# Patient Record
Sex: FEMALE | Race: WHITE | Employment: FULL TIME | URBAN - METROPOLITAN AREA
[De-identification: names, ages, dates, MRNs, and addresses within clinical notes are randomized per-mention and may not be internally consistent; named-entity substitution may affect disease eponyms.]

---

## 2020-01-30 ENCOUNTER — HOSPITAL ENCOUNTER (OUTPATIENT)
Dept: SLEEP CENTER | Age: 60
Discharge: HOME | End: 2020-02-01
Payer: COMMERCIAL

## 2020-01-30 PROCEDURE — 95810 POLYSOM 6/> YRS 4/> PARAM: CPT

## 2023-09-19 LAB
ALANINE AMINOTRANSFERASE (SGPT) (U/L) IN SER/PLAS: 16 U/L (ref 7–45)
ALBUMIN (G/DL) IN SER/PLAS: 4.1 G/DL (ref 3.4–5)
ALKALINE PHOSPHATASE (U/L) IN SER/PLAS: 85 U/L (ref 33–136)
ANION GAP IN SER/PLAS: 12 MMOL/L (ref 10–20)
ASPARTATE AMINOTRANSFERASE (SGOT) (U/L) IN SER/PLAS: 11 U/L (ref 9–39)
BILIRUBIN TOTAL (MG/DL) IN SER/PLAS: 0.5 MG/DL (ref 0–1.2)
CALCIUM (MG/DL) IN SER/PLAS: 8.8 MG/DL (ref 8.6–10.3)
CARBON DIOXIDE, TOTAL (MMOL/L) IN SER/PLAS: 26 MMOL/L (ref 21–32)
CHLORIDE (MMOL/L) IN SER/PLAS: 103 MMOL/L (ref 98–107)
CHOLESTEROL (MG/DL) IN SER/PLAS: 162 MG/DL (ref 0–199)
CHOLESTEROL IN HDL (MG/DL) IN SER/PLAS: 64.9 MG/DL
CHOLESTEROL/HDL RATIO: 2.5
CREATININE (MG/DL) IN SER/PLAS: 0.46 MG/DL (ref 0.5–1.05)
GFR FEMALE: >90 ML/MIN/1.73M2
GLUCOSE (MG/DL) IN SER/PLAS: 229 MG/DL (ref 74–99)
LDL: 78 MG/DL (ref 0–99)
POTASSIUM (MMOL/L) IN SER/PLAS: 4 MMOL/L (ref 3.5–5.3)
PROTEIN TOTAL: 6.9 G/DL (ref 6.4–8.2)
SODIUM (MMOL/L) IN SER/PLAS: 137 MMOL/L (ref 136–145)
THYROTROPIN (MIU/L) IN SER/PLAS BY DETECTION LIMIT <= 0.05 MIU/L: 1.69 MIU/L (ref 0.44–3.98)
TRIGLYCERIDE (MG/DL) IN SER/PLAS: 98 MG/DL (ref 0–149)
UREA NITROGEN (MG/DL) IN SER/PLAS: 9 MG/DL (ref 6–23)
VLDL: 20 MG/DL (ref 0–40)

## 2023-10-05 ENCOUNTER — TELEPHONE (OUTPATIENT)
Dept: ENDOCRINOLOGY | Facility: CLINIC | Age: 63
End: 2023-10-05
Payer: COMMERCIAL

## 2023-10-05 DIAGNOSIS — R10.9 ACUTE ABDOMINAL PAIN: Primary | ICD-10-CM

## 2023-10-05 NOTE — TELEPHONE ENCOUNTER
Patient lm that she had to leave work due to Left sided abdominal pain, thinking she is constipated, having bms but minimal. She thinks this problem may be due to Mounjaro on x2 mo.

## 2023-10-07 ENCOUNTER — APPOINTMENT (OUTPATIENT)
Dept: RADIOLOGY | Facility: HOSPITAL | Age: 63
End: 2023-10-07
Payer: COMMERCIAL

## 2023-10-07 ENCOUNTER — HOSPITAL ENCOUNTER (EMERGENCY)
Facility: HOSPITAL | Age: 63
Discharge: HOME | End: 2023-10-07
Attending: EMERGENCY MEDICINE
Payer: COMMERCIAL

## 2023-10-07 VITALS
WEIGHT: 240 LBS | HEART RATE: 72 BPM | OXYGEN SATURATION: 95 % | BODY MASS INDEX: 39.99 KG/M2 | SYSTOLIC BLOOD PRESSURE: 150 MMHG | RESPIRATION RATE: 21 BRPM | HEIGHT: 65 IN | DIASTOLIC BLOOD PRESSURE: 73 MMHG | TEMPERATURE: 97.3 F

## 2023-10-07 DIAGNOSIS — N39.0 UTI (URINARY TRACT INFECTION), BACTERIAL: Primary | ICD-10-CM

## 2023-10-07 DIAGNOSIS — N20.0 KIDNEY STONE: ICD-10-CM

## 2023-10-07 DIAGNOSIS — A49.9 UTI (URINARY TRACT INFECTION), BACTERIAL: Primary | ICD-10-CM

## 2023-10-07 DIAGNOSIS — K76.0 FATTY LIVER: ICD-10-CM

## 2023-10-07 LAB
ALBUMIN SERPL BCP-MCNC: 4.2 G/DL (ref 3.4–5)
ALP SERPL-CCNC: 86 U/L (ref 33–136)
ALT SERPL W P-5'-P-CCNC: 13 U/L (ref 7–45)
ANION GAP SERPL CALC-SCNC: 13 MMOL/L (ref 10–20)
APPEARANCE UR: CLEAR
AST SERPL W P-5'-P-CCNC: 10 U/L (ref 9–39)
BACTERIA #/AREA URNS AUTO: ABNORMAL /HPF
BILIRUB SERPL-MCNC: 0.7 MG/DL (ref 0–1.2)
BILIRUB UR STRIP.AUTO-MCNC: NEGATIVE MG/DL
BUN SERPL-MCNC: 10 MG/DL (ref 6–23)
CALCIUM SERPL-MCNC: 9.2 MG/DL (ref 8.6–10.3)
CHLORIDE SERPL-SCNC: 101 MMOL/L (ref 98–107)
CO2 SERPL-SCNC: 28 MMOL/L (ref 21–32)
COLOR UR: ABNORMAL
CREAT SERPL-MCNC: 0.52 MG/DL (ref 0.5–1.05)
ERYTHROCYTE [DISTWIDTH] IN BLOOD BY AUTOMATED COUNT: 13.2 % (ref 11.5–14.5)
GFR SERPL CREATININE-BSD FRML MDRD: >90 ML/MIN/1.73M*2
GLUCOSE SERPL-MCNC: 273 MG/DL (ref 74–99)
GLUCOSE UR STRIP.AUTO-MCNC: ABNORMAL MG/DL
HCT VFR BLD AUTO: 39.4 % (ref 36–46)
HGB BLD-MCNC: 12.2 G/DL (ref 12–16)
KETONES UR STRIP.AUTO-MCNC: ABNORMAL MG/DL
LEUKOCYTE ESTERASE UR QL STRIP.AUTO: ABNORMAL
LIPASE SERPL-CCNC: 10 U/L (ref 9–82)
MCH RBC QN AUTO: 28.1 PG (ref 26–34)
MCHC RBC AUTO-ENTMCNC: 31 G/DL (ref 32–36)
MCV RBC AUTO: 91 FL (ref 80–100)
NITRITE UR QL STRIP.AUTO: POSITIVE
NRBC BLD-RTO: 0 /100 WBCS (ref 0–0)
PH UR STRIP.AUTO: 5 [PH]
PLATELET # BLD AUTO: 356 X10*3/UL (ref 150–450)
PMV BLD AUTO: 9.7 FL (ref 7.5–11.5)
POTASSIUM SERPL-SCNC: 3.9 MMOL/L (ref 3.5–5.3)
PROT SERPL-MCNC: 7 G/DL (ref 6.4–8.2)
PROT UR STRIP.AUTO-MCNC: NEGATIVE MG/DL
RBC # BLD AUTO: 4.34 X10*6/UL (ref 4–5.2)
RBC # UR STRIP.AUTO: NEGATIVE /UL
RBC #/AREA URNS AUTO: >20 /HPF
SODIUM SERPL-SCNC: 138 MMOL/L (ref 136–145)
SP GR UR STRIP.AUTO: 1.02
UROBILINOGEN UR STRIP.AUTO-MCNC: 2 MG/DL
WBC # BLD AUTO: 9.5 X10*3/UL (ref 4.4–11.3)
WBC #/AREA URNS AUTO: ABNORMAL /HPF

## 2023-10-07 PROCEDURE — 83690 ASSAY OF LIPASE: CPT

## 2023-10-07 PROCEDURE — 74177 CT ABD & PELVIS W/CONTRAST: CPT

## 2023-10-07 PROCEDURE — 99285 EMERGENCY DEPT VISIT HI MDM: CPT | Performed by: EMERGENCY MEDICINE

## 2023-10-07 PROCEDURE — 74177 CT ABD & PELVIS W/CONTRAST: CPT | Performed by: RADIOLOGY

## 2023-10-07 PROCEDURE — 36415 COLL VENOUS BLD VENIPUNCTURE: CPT

## 2023-10-07 PROCEDURE — 2500000004 HC RX 250 GENERAL PHARMACY W/ HCPCS (ALT 636 FOR OP/ED)

## 2023-10-07 PROCEDURE — 2550000001 HC RX 255 CONTRASTS: Performed by: EMERGENCY MEDICINE

## 2023-10-07 PROCEDURE — 81001 URINALYSIS AUTO W/SCOPE: CPT

## 2023-10-07 PROCEDURE — 96365 THER/PROPH/DIAG IV INF INIT: CPT

## 2023-10-07 PROCEDURE — 85027 COMPLETE CBC AUTOMATED: CPT

## 2023-10-07 PROCEDURE — 96375 TX/PRO/DX INJ NEW DRUG ADDON: CPT

## 2023-10-07 PROCEDURE — 99284 EMERGENCY DEPT VISIT MOD MDM: CPT | Performed by: EMERGENCY MEDICINE

## 2023-10-07 PROCEDURE — 80053 COMPREHEN METABOLIC PANEL: CPT

## 2023-10-07 RX ORDER — CEFTRIAXONE 1 G/50ML
1 INJECTION, SOLUTION INTRAVENOUS ONCE
Status: COMPLETED | OUTPATIENT
Start: 2023-10-07 | End: 2023-10-07

## 2023-10-07 RX ORDER — CEPHALEXIN 500 MG/1
500 CAPSULE ORAL 2 TIMES DAILY
Qty: 28 CAPSULE | Refills: 0 | Status: SHIPPED | OUTPATIENT
Start: 2023-10-07 | End: 2023-10-21

## 2023-10-07 RX ADMIN — IOHEXOL 75 ML: 350 INJECTION, SOLUTION INTRAVENOUS at 16:21

## 2023-10-07 RX ADMIN — CEFTRIAXONE SODIUM 1 G: 1 INJECTION, SOLUTION INTRAVENOUS at 17:27

## 2023-10-07 ASSESSMENT — PAIN SCALES - GENERAL: PAINLEVEL_OUTOF10: 0 - NO PAIN

## 2023-10-07 ASSESSMENT — LIFESTYLE VARIABLES
EVER HAD A DRINK FIRST THING IN THE MORNING TO STEADY YOUR NERVES TO GET RID OF A HANGOVER: NO
HAVE YOU EVER FELT YOU SHOULD CUT DOWN ON YOUR DRINKING: NO
HAVE PEOPLE ANNOYED YOU BY CRITICIZING YOUR DRINKING: NO
EVER FELT BAD OR GUILTY ABOUT YOUR DRINKING: NO

## 2023-10-07 ASSESSMENT — COLUMBIA-SUICIDE SEVERITY RATING SCALE - C-SSRS
1. IN THE PAST MONTH, HAVE YOU WISHED YOU WERE DEAD OR WISHED YOU COULD GO TO SLEEP AND NOT WAKE UP?: NO
6. HAVE YOU EVER DONE ANYTHING, STARTED TO DO ANYTHING, OR PREPARED TO DO ANYTHING TO END YOUR LIFE?: NO
2. HAVE YOU ACTUALLY HAD ANY THOUGHTS OF KILLING YOURSELF?: NO

## 2023-10-07 ASSESSMENT — PAIN - FUNCTIONAL ASSESSMENT: PAIN_FUNCTIONAL_ASSESSMENT: 0-10

## 2023-10-07 NOTE — ED PROVIDER NOTES
HPI   Chief Complaint   Patient presents with    Abdominal Pain     Lower L side pain, resolved since AM       63-year-old female with history significant for hypertension, hypothyroidism, and NIDDM 2 presenting to [facility] ED with a complaint of left lower quadrant abdominal pain.  She reports her pain began 2 days ago while she was sitting down at work suddenly and lasted a few hours and almost prevented her from walking.  States she felt nauseous and short of breath with the pain.  States she came to the ED last night but she was waiting for some time and the pain gradually subsided and when she went home.  States that this morning she had the pain again it was not but it was not as severe and resolved with heating pad.  She reports concern for kidney stone.  Denies chest pain, shortness of breath, fevers, dysuria, redness in urine or stool, or constipation.      History provided by:  Patient                      No data recorded                Patient History   No past medical history on file.  No past surgical history on file.  No family history on file.  Social History     Tobacco Use    Smoking status: Not on file    Smokeless tobacco: Not on file   Substance Use Topics    Alcohol use: Not on file    Drug use: Not on file       Physical Exam   ED Triage Vitals [10/07/23 1354]   Temp Heart Rate Resp BP   36.3 °C (97.3 °F) 83 18 (!) 183/88      SpO2 Temp Source Heart Rate Source Patient Position   97 % Temporal -- --      BP Location FiO2 (%)     -- --       Physical Exam  VITALS: I have reviewed the triage vital signs.   GENERAL: Well developed, well appearing adult in no acute distress.  Resting comfortably in bed.  NEURO: Alert and oriented. Moves all extremities. Face is symmetric and expressive.   EYES: EOMI. No scleral icterus or conjunctival injection. No discharge.   HENT: Normocephalic, atraumatic. Hearing is grossly intact. Nares grossly patent and without discharge. Mucous membranes moist with no  visible lesions.   NECK: Trachea midline. Patient moves neck without restriction.   CARDIO: Rhythm regular. Normal rate. No murmurs, rubs, or gallops. Radial/Dorsal pulses 2+ and equal bilaterally. No lower extremity edema.   PULM: Lungs clear to auscultation in all fields. No wheezes, rales, or rhonchi. No conversational dyspnea. No splinting, stridor, or accessory muscle use.    GI: Abdomen is soft and non-distended. No tenderness to palpation. No guarding or rigidity.   : No suprapubic tenderness. No CVA tenderness.  MUSCULOSKELETAL: Symmetric muscle build. No joint swelling. No clubbing, cyanosis, or deformity.   SKIN: Warm, dry, and intact. Normal turgor. No rash or lesions appreciated.   PSYCH: Mood, affect, and interaction is appropriate to the setting.     ED Course & MDM   Diagnoses as of 10/08/23 0704   UTI (urinary tract infection), bacterial   Kidney stone   Fatty liver       Medical Decision Making  63-year-old female with history much above presenting to the ED with complaint of left lower abdominal pain.  Patient is hypertensive at 183/88 but otherwise afebrile, hemodynamically stable on room air, and in no acute distress.  Physical exam findings mentioned above.  CBC, CMP, lipase, UA, and CT abdomen and pelvis IV contrast ordered.    My independent interpretation of labs:  Labs returned revealing a nitrite positive UTI but otherwise unimpressive for systemic inflammation or infection, acute anemia or blood loss, PAUL, hepatitis, or electrolyte abnormalities.  My indpendent interpretation of imaging:  CT abdomen pelvis reveals a calculus in the bladder but otherwise no stranding of the bowel, dilation and transition point to suggest obstruction, hemorrhage, or free air in the abdomen.    Reassessment:  Patient updated on findings of passed kidney stone and positive UTI.  Rocephin provided.  Urine strainer and Keflex prescription prescribed.    Disposition:  Discussed with patient who agreed with  discharge.  She will utilize antibiotics provided as well as urine strainer.  She will follow-up with the provided urologist in 2 days.  Strict return precautions provided.        Procedure  Procedures     Joe Choi MD  Resident  10/08/23 0726

## 2023-10-07 NOTE — DISCHARGE INSTRUCTIONS
Please follow-up with the providers given.    Seek immediate medical attention for:  Painful urination, fevers, palpitations, lightheadedness, confusion, shortness of breath, weakness, fatigue, lightheadedness, or any new or concerning symptoms that are not known side effects of medication.

## 2023-11-08 DIAGNOSIS — E11.69 TYPE 2 DIABETES MELLITUS WITH OTHER SPECIFIED COMPLICATION, UNSPECIFIED WHETHER LONG TERM INSULIN USE (MULTI): Primary | ICD-10-CM

## 2023-11-08 RX ORDER — TIRZEPATIDE 5 MG/.5ML
5 INJECTION, SOLUTION SUBCUTANEOUS
Qty: 6 ML | Refills: 2 | Status: SHIPPED | OUTPATIENT
Start: 2023-11-08 | End: 2023-12-12 | Stop reason: ALTCHOICE

## 2023-12-12 ENCOUNTER — OFFICE VISIT (OUTPATIENT)
Dept: ENDOCRINOLOGY | Facility: CLINIC | Age: 63
End: 2023-12-12
Payer: COMMERCIAL

## 2023-12-12 VITALS
SYSTOLIC BLOOD PRESSURE: 167 MMHG | DIASTOLIC BLOOD PRESSURE: 84 MMHG | HEIGHT: 65 IN | WEIGHT: 236 LBS | BODY MASS INDEX: 39.32 KG/M2

## 2023-12-12 DIAGNOSIS — E03.9 HYPOTHYROIDISM, UNSPECIFIED TYPE: ICD-10-CM

## 2023-12-12 DIAGNOSIS — I10 BENIGN ESSENTIAL HYPERTENSION: ICD-10-CM

## 2023-12-12 DIAGNOSIS — E78.5 DYSLIPIDEMIA: ICD-10-CM

## 2023-12-12 DIAGNOSIS — Z79.4 TYPE 2 DIABETES MELLITUS WITHOUT COMPLICATION, WITH LONG-TERM CURRENT USE OF INSULIN (MULTI): Primary | ICD-10-CM

## 2023-12-12 DIAGNOSIS — E11.9 TYPE 2 DIABETES MELLITUS WITHOUT COMPLICATION, WITH LONG-TERM CURRENT USE OF INSULIN (MULTI): Primary | ICD-10-CM

## 2023-12-12 LAB
POC FINGERSTICK BLOOD GLUCOSE: 189 MG/DL (ref 70–100)
POC HEMOGLOBIN A1C: 8 % (ref 4.2–6.5)

## 2023-12-12 PROCEDURE — 3077F SYST BP >= 140 MM HG: CPT | Performed by: HOSPITALIST

## 2023-12-12 PROCEDURE — 82962 GLUCOSE BLOOD TEST: CPT | Performed by: HOSPITALIST

## 2023-12-12 PROCEDURE — 83036 HEMOGLOBIN GLYCOSYLATED A1C: CPT | Performed by: HOSPITALIST

## 2023-12-12 PROCEDURE — 99214 OFFICE O/P EST MOD 30 MIN: CPT | Performed by: HOSPITALIST

## 2023-12-12 PROCEDURE — 95251 CONT GLUC MNTR ANALYSIS I&R: CPT | Performed by: HOSPITALIST

## 2023-12-12 PROCEDURE — 4010F ACE/ARB THERAPY RXD/TAKEN: CPT | Performed by: HOSPITALIST

## 2023-12-12 PROCEDURE — 3079F DIAST BP 80-89 MM HG: CPT | Performed by: HOSPITALIST

## 2023-12-12 RX ORDER — LEVOTHYROXINE SODIUM 112 UG/1
112 TABLET ORAL
Qty: 90 TABLET | Refills: 2 | Status: SHIPPED | OUTPATIENT
Start: 2023-12-12 | End: 2024-06-07 | Stop reason: SDUPTHER

## 2023-12-12 RX ORDER — METFORMIN HYDROCHLORIDE 1000 MG/1
1000 TABLET ORAL
COMMUNITY
Start: 2022-05-24 | End: 2023-12-12 | Stop reason: SDUPTHER

## 2023-12-12 RX ORDER — INSULIN LISPRO 100 [IU]/ML
INJECTION, SOLUTION INTRAVENOUS; SUBCUTANEOUS
COMMUNITY
End: 2023-12-12 | Stop reason: SDUPTHER

## 2023-12-12 RX ORDER — METFORMIN HYDROCHLORIDE 1000 MG/1
1000 TABLET ORAL
Qty: 180 TABLET | Refills: 2 | Status: SHIPPED | OUTPATIENT
Start: 2023-12-12 | End: 2024-06-07 | Stop reason: SDUPTHER

## 2023-12-12 RX ORDER — LEVOTHYROXINE SODIUM 112 UG/1
112 TABLET ORAL DAILY
COMMUNITY
End: 2023-12-12 | Stop reason: SDUPTHER

## 2023-12-12 RX ORDER — FLASH GLUCOSE SENSOR
KIT MISCELLANEOUS
Qty: 6 EACH | Refills: 3 | Status: SHIPPED | OUTPATIENT
Start: 2023-12-12 | End: 2024-06-07 | Stop reason: SDUPTHER

## 2023-12-12 RX ORDER — INSULIN GLARGINE 300 U/ML
36 INJECTION, SOLUTION SUBCUTANEOUS EVERY MORNING
COMMUNITY
End: 2023-12-12 | Stop reason: SDUPTHER

## 2023-12-12 RX ORDER — TIRZEPATIDE 7.5 MG/.5ML
7.5 INJECTION, SOLUTION SUBCUTANEOUS
Qty: 6 ML | Refills: 2 | Status: SHIPPED | OUTPATIENT
Start: 2023-12-12 | End: 2024-04-25 | Stop reason: DRUGHIGH

## 2023-12-12 RX ORDER — OLMESARTAN MEDOXOMIL 40 MG/1
40 TABLET ORAL DAILY
COMMUNITY
Start: 2022-06-02 | End: 2023-12-23

## 2023-12-12 RX ORDER — INSULIN LISPRO 100 [IU]/ML
INJECTION, SOLUTION INTRAVENOUS; SUBCUTANEOUS
Qty: 40 ML | Refills: 3 | Status: SHIPPED | OUTPATIENT
Start: 2023-12-12 | End: 2024-06-07 | Stop reason: SDUPTHER

## 2023-12-12 RX ORDER — INSULIN GLARGINE 300 U/ML
38 INJECTION, SOLUTION SUBCUTANEOUS EVERY MORNING
Qty: 11.4 ML | Refills: 2 | Status: SHIPPED | OUTPATIENT
Start: 2023-12-12 | End: 2024-06-07 | Stop reason: SDUPTHER

## 2023-12-12 RX ORDER — TRAMADOL HYDROCHLORIDE 50 MG/1
50 TABLET, COATED ORAL
COMMUNITY
Start: 2023-12-09 | End: 2023-12-24

## 2023-12-12 ASSESSMENT — ENCOUNTER SYMPTOMS
PALPITATIONS: 0
VOICE CHANGE: 0
NERVOUS/ANXIOUS: 0
ARTHRALGIAS: 0
SORE THROAT: 0
ABDOMINAL PAIN: 0
VOMITING: 0
CONSTIPATION: 0
ABDOMINAL DISTENTION: 0
DYSURIA: 0
PHOTOPHOBIA: 0
HEADACHES: 0
NAUSEA: 0
AGITATION: 0
SHORTNESS OF BREATH: 0
LIGHT-HEADEDNESS: 0
TREMORS: 0
DIARRHEA: 0
CHEST TIGHTNESS: 0
EYE ITCHING: 0
FREQUENCY: 0
TROUBLE SWALLOWING: 0
SLEEP DISTURBANCE: 0
CONSTITUTIONAL NEGATIVE: 1

## 2023-12-12 NOTE — PROGRESS NOTES
Subjective   Patient ID: Alina Chavarria is a 63 y.o. female who presents for Diabetes (Dx dm: GDM; 1998/PCP: Dr. Darling in [place] /Podiatry: does not see one /Eye exam: yearly /Patient using freestyle kaden 2, connected to office, LINKED to office /Patient testing glucose 4 times daily. due to fluctuating blood glucose, hypoglycemia and/4 insulin injections daily. Patient is injecting meal time insulin 3 times daily based on glucose reading and sliding scale. /Last hga1c 9/27/23 8.0%) and Hypothyroidism (Takes thyroid medication correctly ).  Lab Results   Component Value Date    HGBA1C 8.0 (A) 12/12/2023      HPI    Review of Systems   Constitutional: Negative.    HENT:  Negative for sore throat, trouble swallowing and voice change.    Eyes:  Negative for photophobia, itching and visual disturbance.   Respiratory:  Negative for chest tightness and shortness of breath.    Cardiovascular:  Negative for chest pain and palpitations.   Gastrointestinal:  Negative for abdominal distention, abdominal pain, constipation, diarrhea, nausea and vomiting.   Endocrine: Negative for cold intolerance, heat intolerance and polyuria.   Genitourinary:  Negative for dysuria and frequency.   Musculoskeletal:  Negative for arthralgias.   Skin:  Negative for pallor.   Allergic/Immunologic: Negative for environmental allergies.   Neurological:  Negative for tremors, light-headedness and headaches.   Psychiatric/Behavioral:  Negative for agitation and sleep disturbance. The patient is not nervous/anxious.        Objective   Physical Exam  Constitutional:       Appearance: Normal appearance.   HENT:      Head: Normocephalic.      Nose: Nose normal.      Mouth/Throat:      Mouth: Mucous membranes are moist.   Eyes:      Extraocular Movements: Extraocular movements intact.   Cardiovascular:      Rate and Rhythm: Normal rate.   Pulmonary:      Effort: Pulmonary effort is normal. No respiratory distress.   Abdominal:      General: There  "is no distension.   Musculoskeletal:         General: Normal range of motion.      Cervical back: Normal range of motion and neck supple.   Skin:     General: Skin is warm and dry.   Neurological:      Mental Status: She is alert and oriented to person, place, and time.   Psychiatric:         Mood and Affect: Mood normal.       Visit Vitals  /84   Ht 1.651 m (5' 5\")   Wt 107 kg (236 lb)   BMI 39.27 kg/m²   BSA 2.22 m²        Assessment/Plan   Diagnoses and all orders for this visit:  Type 2 diabetes mellitus without complication, with long-term current use of insulin (CMS/[facility])  -     POCT glycosylated hemoglobin (Hb A1C) manually resulted  -     POCT glucose manually resulted  -     tirzepatide (Mounjaro) 7.5 mg/0.5 mL pen injector; Inject 7.5 mg under the skin every 7 days.  -     Toujeo SoloStar U-300 Insulin 300 unit/mL (1.5 mL) injection; Inject 38 Units under the skin once daily in the morning.  -     HumaLOG KwikPen Insulin 100 unit/mL injection; INJECT 6 (SIX) UNITS SUBCUTANEOUSLY WITH BREAKFAST, then EIGHT UNITS WITH lunch, and TEN UNITS WITH DINNER. plus sliding scale AS DIRECTED. (max daily dose OF 50 UNITS)]  -     metFORMIN (Glucophage) 1,000 mg tablet; Take 1 tablet (1,000 mg) by mouth 2 times a day with meals.  -     FreeStyle Ney sensor system (FreeStyle Ney 2 Sensor) kit; Change sensor EVERY 14 days  Hypothyroidism, unspecified type  -     Synthroid 112 mcg tablet; Take 1 tablet (112 mcg) by mouth once daily in the morning. Take before meals. NOEL  Dyslipidemia  Benign essential hypertension     64 y/o F with type II DM since the early 90's. Reports having gestational DM in '91.     Pt is currently taking Toujeo 36-0-0-0, Metformin 1g BID, Humalog 8-6-10-0 +2 units per 50>150 ( takes differently, misses lunch if she is only eating salad), mounajro 7 mg weekly ( started sept 2023  )      She did had abd pain and was in ED after starting on mounjaro but was found to have KIDNEY stones   No SE " currently no mounjaro  she takes humalog before eating. when BG < 90 doesn't take humalog      Rybelsus - stopped due to SE on 14 mg dose , we tried 3 mg and still had a lot of GI issues mostly diarrhea   did not tolerate SGLT-2 inhibitors due to yeast infections previously.     Pt continues using the Freestyle Ney CGM, data reviewed today on her phone which was reviewed   She has significant day to day fluctuation. active time 96%, TIR 37% ,occ hypoglycemia before dinner ,morning -300s.      HgA1c stable and above goal      - INCREASE mounajro to 7.5 mg weekly   Change Toujeo 38-0-0-0  - take humalog 15 min before eating    - discussed again SE of GLP 1 agonist   - discussed take half insulin if meal has low carb like salad. < 30 g carbs or if low BG  - discussed lifestyle modifications.  - will discuss GVOKe next visit     -eye exam UTD   -negative microalbumin  -creatinine normal  -LDL and non-HDL cholesterol around goal, pt declined statin in past  -Pt instructed to call office with any hypoglycemia, hyperglycemia, or any other concerns     # hypothyroidism on replacement.  Pt is currently taking Synthroid 112mcg daily.  Pt clinically and biochemically euthyroid, continue same      RTC 4m          SH-  on mounjaro with great control of DM.     Lizeth Ward MD

## 2024-01-27 DIAGNOSIS — E11.29 TYPE 2 DIABETES MELLITUS WITH OTHER DIABETIC KIDNEY COMPLICATION (MULTI): ICD-10-CM

## 2024-01-29 RX ORDER — LANCETS 33 GAUGE
EACH MISCELLANEOUS
Qty: 200 EACH | Refills: 3 | Status: SHIPPED | OUTPATIENT
Start: 2024-01-29 | End: 2024-06-07 | Stop reason: SDUPTHER

## 2024-03-08 PROCEDURE — RXMED WILLOW AMBULATORY MEDICATION CHARGE

## 2024-03-09 ENCOUNTER — PHARMACY VISIT (OUTPATIENT)
Dept: PHARMACY | Facility: CLINIC | Age: 64
End: 2024-03-09
Payer: COMMERCIAL

## 2024-03-30 PROCEDURE — RXMED WILLOW AMBULATORY MEDICATION CHARGE

## 2024-04-01 ENCOUNTER — TELEPHONE (OUTPATIENT)
Dept: ENDOCRINOLOGY | Facility: CLINIC | Age: 64
End: 2024-04-01
Payer: COMMERCIAL

## 2024-04-01 NOTE — TELEPHONE ENCOUNTER
Alina called 4-1-24.  She has not been able to get Mounjaro from the   Pharmacy.  Her question is since she has been on it for months how long can she go without it?  Also is there any alternative.  Please advise.  Thanks.  I told her I would call her back.

## 2024-04-02 DIAGNOSIS — Z79.4 TYPE 2 DIABETES MELLITUS WITHOUT COMPLICATION, WITH LONG-TERM CURRENT USE OF INSULIN (MULTI): Primary | ICD-10-CM

## 2024-04-02 DIAGNOSIS — E11.9 TYPE 2 DIABETES MELLITUS WITHOUT COMPLICATION, WITH LONG-TERM CURRENT USE OF INSULIN (MULTI): Primary | ICD-10-CM

## 2024-04-02 RX ORDER — SEMAGLUTIDE 1.34 MG/ML
0.25 INJECTION, SOLUTION SUBCUTANEOUS
Qty: 4 ML | Refills: 0 | Status: SHIPPED | OUTPATIENT
Start: 2024-04-02 | End: 2024-06-07 | Stop reason: ALTCHOICE

## 2024-04-05 ENCOUNTER — PHARMACY VISIT (OUTPATIENT)
Dept: PHARMACY | Facility: CLINIC | Age: 64
End: 2024-04-05
Payer: COMMERCIAL

## 2024-04-24 DIAGNOSIS — E03.9 HYPOTHYROIDISM, UNSPECIFIED TYPE: Primary | ICD-10-CM

## 2024-04-24 DIAGNOSIS — E11.9 TYPE 2 DIABETES MELLITUS WITHOUT COMPLICATION, WITH LONG-TERM CURRENT USE OF INSULIN (MULTI): ICD-10-CM

## 2024-04-24 DIAGNOSIS — Z79.4 TYPE 2 DIABETES MELLITUS WITHOUT COMPLICATION, WITH LONG-TERM CURRENT USE OF INSULIN (MULTI): ICD-10-CM

## 2024-04-25 DIAGNOSIS — E11.9 TYPE 2 DIABETES MELLITUS WITHOUT COMPLICATION, WITH LONG-TERM CURRENT USE OF INSULIN (MULTI): ICD-10-CM

## 2024-04-25 DIAGNOSIS — Z79.4 TYPE 2 DIABETES MELLITUS WITHOUT COMPLICATION, WITH LONG-TERM CURRENT USE OF INSULIN (MULTI): ICD-10-CM

## 2024-04-26 PROCEDURE — RXMED WILLOW AMBULATORY MEDICATION CHARGE

## 2024-04-26 RX ORDER — TIRZEPATIDE 10 MG/.5ML
10 INJECTION, SOLUTION SUBCUTANEOUS
Qty: 2 ML | Refills: 0 | Status: SHIPPED | OUTPATIENT
Start: 2024-04-26

## 2024-04-26 RX ORDER — TIRZEPATIDE 10 MG/.5ML
10 INJECTION, SOLUTION SUBCUTANEOUS
Qty: 6 ML | Refills: 1 | Status: SHIPPED | OUTPATIENT
Start: 2024-04-28 | End: 2024-06-07 | Stop reason: SDUPTHER

## 2024-04-27 ENCOUNTER — PHARMACY VISIT (OUTPATIENT)
Dept: PHARMACY | Facility: CLINIC | Age: 64
End: 2024-04-27
Payer: COMMERCIAL

## 2024-04-29 ENCOUNTER — APPOINTMENT (OUTPATIENT)
Dept: ENDOCRINOLOGY | Facility: CLINIC | Age: 64
End: 2024-04-29
Payer: COMMERCIAL

## 2024-05-20 PROCEDURE — RXMED WILLOW AMBULATORY MEDICATION CHARGE

## 2024-05-25 ENCOUNTER — PHARMACY VISIT (OUTPATIENT)
Dept: PHARMACY | Facility: CLINIC | Age: 64
End: 2024-05-25
Payer: COMMERCIAL

## 2024-05-28 ENCOUNTER — LAB (OUTPATIENT)
Dept: LAB | Facility: LAB | Age: 64
End: 2024-05-28
Payer: COMMERCIAL

## 2024-05-28 DIAGNOSIS — R10.9 ACUTE ABDOMINAL PAIN: ICD-10-CM

## 2024-05-28 DIAGNOSIS — E03.9 HYPOTHYROIDISM, UNSPECIFIED TYPE: ICD-10-CM

## 2024-05-28 DIAGNOSIS — Z79.4 TYPE 2 DIABETES MELLITUS WITHOUT COMPLICATION, WITH LONG-TERM CURRENT USE OF INSULIN (MULTI): ICD-10-CM

## 2024-05-28 DIAGNOSIS — E11.9 TYPE 2 DIABETES MELLITUS WITHOUT COMPLICATION, WITH LONG-TERM CURRENT USE OF INSULIN (MULTI): ICD-10-CM

## 2024-05-28 LAB
ALBUMIN SERPL BCP-MCNC: 4.2 G/DL (ref 3.4–5)
ALP SERPL-CCNC: 79 U/L (ref 33–136)
ALT SERPL W P-5'-P-CCNC: 12 U/L (ref 7–45)
AMYLASE SERPL-CCNC: 25 U/L (ref 29–103)
ANION GAP SERPL CALC-SCNC: 13 MMOL/L (ref 10–20)
AST SERPL W P-5'-P-CCNC: 10 U/L (ref 9–39)
BILIRUB SERPL-MCNC: 0.5 MG/DL (ref 0–1.2)
BUN SERPL-MCNC: 12 MG/DL (ref 6–23)
CALCIUM SERPL-MCNC: 8.9 MG/DL (ref 8.6–10.3)
CHLORIDE SERPL-SCNC: 103 MMOL/L (ref 98–107)
CHOLEST SERPL-MCNC: 156 MG/DL (ref 0–199)
CHOLESTEROL/HDL RATIO: 2.4
CO2 SERPL-SCNC: 26 MMOL/L (ref 21–32)
CREAT SERPL-MCNC: 0.55 MG/DL (ref 0.5–1.05)
CREAT UR-MCNC: 140.5 MG/DL (ref 20–320)
EGFRCR SERPLBLD CKD-EPI 2021: >90 ML/MIN/1.73M*2
EST. AVERAGE GLUCOSE BLD GHB EST-MCNC: 177 MG/DL
GLUCOSE SERPL-MCNC: 161 MG/DL (ref 74–99)
HBA1C MFR BLD: 7.8 %
HDLC SERPL-MCNC: 65.2 MG/DL
LDLC SERPL CALC-MCNC: 72 MG/DL
LIPASE SERPL-CCNC: 18 U/L (ref 9–82)
MICROALBUMIN UR-MCNC: 14.7 MG/L
MICROALBUMIN/CREAT UR: 10.5 UG/MG CREAT
NON HDL CHOLESTEROL: 91 MG/DL (ref 0–149)
POTASSIUM SERPL-SCNC: 4.2 MMOL/L (ref 3.5–5.3)
PROT SERPL-MCNC: 6.3 G/DL (ref 6.4–8.2)
SODIUM SERPL-SCNC: 138 MMOL/L (ref 136–145)
TRIGL SERPL-MCNC: 94 MG/DL (ref 0–149)
TSH SERPL-ACNC: 1.17 MIU/L (ref 0.44–3.98)
VLDL: 19 MG/DL (ref 0–40)

## 2024-05-28 PROCEDURE — 83690 ASSAY OF LIPASE: CPT

## 2024-05-28 PROCEDURE — 36415 COLL VENOUS BLD VENIPUNCTURE: CPT

## 2024-05-28 PROCEDURE — 83036 HEMOGLOBIN GLYCOSYLATED A1C: CPT

## 2024-05-28 PROCEDURE — 82043 UR ALBUMIN QUANTITATIVE: CPT

## 2024-05-28 PROCEDURE — 84443 ASSAY THYROID STIM HORMONE: CPT

## 2024-05-28 PROCEDURE — 80061 LIPID PANEL: CPT

## 2024-05-28 PROCEDURE — 82570 ASSAY OF URINE CREATININE: CPT

## 2024-05-28 PROCEDURE — 82150 ASSAY OF AMYLASE: CPT

## 2024-05-28 PROCEDURE — 80053 COMPREHEN METABOLIC PANEL: CPT

## 2024-06-07 ENCOUNTER — TELEMEDICINE (OUTPATIENT)
Dept: ENDOCRINOLOGY | Facility: CLINIC | Age: 64
End: 2024-06-07
Payer: COMMERCIAL

## 2024-06-07 VITALS — HEIGHT: 65 IN | WEIGHT: 230 LBS | BODY MASS INDEX: 38.32 KG/M2

## 2024-06-07 DIAGNOSIS — E03.9 HYPOTHYROIDISM, UNSPECIFIED TYPE: ICD-10-CM

## 2024-06-07 DIAGNOSIS — Z79.4 TYPE 2 DIABETES MELLITUS WITHOUT COMPLICATION, WITH LONG-TERM CURRENT USE OF INSULIN (MULTI): Primary | ICD-10-CM

## 2024-06-07 DIAGNOSIS — E11.9 TYPE 2 DIABETES MELLITUS WITHOUT COMPLICATION, WITH LONG-TERM CURRENT USE OF INSULIN (MULTI): Primary | ICD-10-CM

## 2024-06-07 DIAGNOSIS — E11.29 TYPE 2 DIABETES MELLITUS WITH OTHER DIABETIC KIDNEY COMPLICATION (MULTI): ICD-10-CM

## 2024-06-07 DIAGNOSIS — Z97.8 USES SELF-APPLIED CONTINUOUS GLUCOSE MONITORING DEVICE: ICD-10-CM

## 2024-06-07 RX ORDER — PEN NEEDLE, DIABETIC 30 GX3/16"
NEEDLE, DISPOSABLE MISCELLANEOUS
Qty: 200 EACH | Refills: 3 | Status: SHIPPED | OUTPATIENT
Start: 2024-06-07

## 2024-06-07 RX ORDER — METFORMIN HYDROCHLORIDE 1000 MG/1
1000 TABLET ORAL
Qty: 180 TABLET | Refills: 2 | Status: SHIPPED | OUTPATIENT
Start: 2024-06-07

## 2024-06-07 RX ORDER — INSULIN LISPRO 100 [IU]/ML
INJECTION, SOLUTION INTRAVENOUS; SUBCUTANEOUS
Qty: 40 ML | Refills: 3 | Status: SHIPPED | OUTPATIENT
Start: 2024-06-07

## 2024-06-07 RX ORDER — LEVOTHYROXINE SODIUM 112 UG/1
112 TABLET ORAL
Qty: 90 TABLET | Refills: 2 | Status: SHIPPED | OUTPATIENT
Start: 2024-06-07 | End: 2025-03-04

## 2024-06-07 RX ORDER — INSULIN GLARGINE 300 U/ML
40 INJECTION, SOLUTION SUBCUTANEOUS EVERY MORNING
Qty: 12 ML | Refills: 2 | Status: SHIPPED | OUTPATIENT
Start: 2024-06-07 | End: 2025-03-04

## 2024-06-07 RX ORDER — TIRZEPATIDE 10 MG/.5ML
10 INJECTION, SOLUTION SUBCUTANEOUS
Qty: 6 ML | Refills: 1 | Status: SHIPPED | OUTPATIENT
Start: 2024-06-09

## 2024-06-07 RX ORDER — FLASH GLUCOSE SENSOR
KIT MISCELLANEOUS
Qty: 6 EACH | Refills: 3 | Status: SHIPPED | OUTPATIENT
Start: 2024-06-07

## 2024-06-07 ASSESSMENT — ENCOUNTER SYMPTOMS
TREMORS: 0
NAUSEA: 0
VOICE CHANGE: 0
VOMITING: 0
LIGHT-HEADEDNESS: 0
ABDOMINAL PAIN: 0
EYE ITCHING: 0
FREQUENCY: 0
CHEST TIGHTNESS: 0
TROUBLE SWALLOWING: 0
DYSURIA: 0
CONSTITUTIONAL NEGATIVE: 1
ARTHRALGIAS: 0
CONSTIPATION: 0
SLEEP DISTURBANCE: 0
HEADACHES: 0
SORE THROAT: 0
NERVOUS/ANXIOUS: 0
AGITATION: 0
ABDOMINAL DISTENTION: 0
SHORTNESS OF BREATH: 0
DIARRHEA: 0
PALPITATIONS: 0
PHOTOPHOBIA: 0

## 2024-06-07 NOTE — PROGRESS NOTES
"Subjective   Patient ID: Alina Chavarria is a 63 y.o. female who presents for Diabetes (VIRTUAL VISIT:::: /(Dx dm: GDM; 1998/PCP: Dr. Darling in [place] /Podiatry: does not see one /Eye exam: yearly /Patient using freestyle kaden 2, connected to office, LINKED to office /Patient testing glucose 4 times daily. due to fluctuating blood glucose, hypoglycemia and/4 insulin injections daily. Patient is injecting meal time insulin 3 times daily based on glucose reading and sliding scale) and Hypothyroidism (Current regimen: NOEL Synthroid 112 mcg every day; /Takes thyroid medication correctly).  '  Lab Results   Component Value Date    HGBA1C 7.8 (H) 05/28/2024      Diabetes  Pertinent negatives for hypoglycemia include no headaches, nervousness/anxiousness, pallor or tremors. Pertinent negatives for diabetes include no chest pain and no polyuria.   See AP    Review of Systems   Constitutional: Negative.    HENT:  Negative for sore throat, trouble swallowing and voice change.    Eyes:  Negative for photophobia, itching and visual disturbance.   Respiratory:  Negative for chest tightness and shortness of breath.    Cardiovascular:  Negative for chest pain and palpitations.   Gastrointestinal:  Negative for abdominal distention, abdominal pain, constipation, diarrhea, nausea and vomiting.   Endocrine: Negative for cold intolerance, heat intolerance and polyuria.   Genitourinary:  Negative for dysuria and frequency.   Musculoskeletal:  Negative for arthralgias.   Skin:  Negative for pallor.   Allergic/Immunologic: Negative for environmental allergies.   Neurological:  Negative for tremors, light-headedness and headaches.   Psychiatric/Behavioral:  Negative for agitation and sleep disturbance. The patient is not nervous/anxious.        Objective   Physical Exam Visit Vitals  Ht 1.651 m (5' 5\")   Wt 104 kg (230 lb)   BMI 38.27 kg/m²   BSA 2.18 m²        Limited tele visit    Nad    No distress    Assessment/Plan   Diagnoses " "and all orders for this visit:  Type 2 diabetes mellitus without complication, with long-term current use of insulin (Multi)  -     flash glucose sensor kit (FreeStyle Ney 2 Sensor) kit; Change sensor EVERY 14 days  -     HumaLOG KwikPen Insulin 100 unit/mL injection; INJECT 6 (SIX) UNITS SUBCUTANEOUSLY WITH BREAKFAST, then EIGHT UNITS WITH lunch, and TEN UNITS WITH DINNER. plus sliding scale AS DIRECTED. (max daily dose OF 50 UNITS)]  -     metFORMIN (Glucophage) 1,000 mg tablet; Take 1 tablet (1,000 mg) by mouth 2 times daily (morning and late afternoon).  -     tirzepatide (Mounjaro) 10 mg/0.5 mL pen injector; Inject 10 mg under the skin 1 (one) time per week.  -     Toujeo SoloStar U-300 Insulin 300 unit/mL (1.5 mL) injection; Inject 40 Units under the skin once daily in the morning.  Hypothyroidism, unspecified type  -     Synthroid 112 mcg tablet; Take 1 tablet (112 mcg) by mouth once daily in the morning. Take before meals. NOEL  Type 2 diabetes mellitus with other diabetic kidney complication (Multi)  -     pen needle, diabetic (Unifine Pentips Plus) 32 gauge x 5/32\" needle; use 1 needle FOUR TIMES DAILY  Uses self-applied continuous glucose monitoring device         64 yo T2 DM ,gestational DM in '91.     Current regimen:    Toujeo 36-0-0-0  Metformin 1g BID  Humalog 8-6-10-0 +2 units per 50>150 ( takes differently, misses lunch if she is only eating salad),   mounajro 10 mg weekly ( started sept 2023  ) lost ~18 lbs since 9/2023     She did had abd pain and was in ED after starting on mounjaro in 10/2023 but was found to have KIDNEY stones   No SE currently no mounjaro  she takes humalog before eating. when BG < 90 doesn't take humalog       Rybelsus - stopped due to SE on 14 mg dose , we tried 3 mg and still had a lot of GI issues mostly diarrhea   did not tolerate SGLT-2 inhibitors due to yeast infections previously.     Pt continues using the Freestyle Ney 2 CGM, data reviewed today on her phone " which was reviewed   She has significant day to day fluctuation. active time 95%, TIR 43% ,occ hypoglycemia     HgA1c 7.8 % and above goal      - INCREASE Toujeo 40-0-0-0  - take humalog 15 min before eating. Advised 2 extra units humalog before dinner      - discussed again SE of GLP 1 agonist  - discussed jardiance ( denies uti/yeast infections in past. If A1c > 7.5 % next visit will start sglt2 inh . We may be able to discontinue humalog after starting sglt2 inh   - discussed take half insulin if meal has low carb like salad. < 30 g carbs or if low BG  - discussed lifestyle modifications.  - will discuss GVOKe next visit     -eye exam UTD   -negative microalbumin  -creatinine normal  -LDL and non-HDL cholesterol around goal, pt declined statin in past  -Pt instructed to call office with any hypoglycemia, hyperglycemia, or any other concerns     # hypothyroidism on replacement.  Pt is currently taking Synthroid 112mcg daily.  Pt clinically and biochemically euthyroid, continue same      RTC 4m          SH-  on mounjaro as well with great control of DM.      Lizeth Ward MD

## 2024-06-24 PROCEDURE — RXMED WILLOW AMBULATORY MEDICATION CHARGE

## 2024-06-28 ENCOUNTER — PHARMACY VISIT (OUTPATIENT)
Dept: PHARMACY | Facility: CLINIC | Age: 64
End: 2024-06-28
Payer: COMMERCIAL

## 2024-07-23 DIAGNOSIS — E11.9 TYPE 2 DIABETES MELLITUS WITHOUT COMPLICATION, WITH LONG-TERM CURRENT USE OF INSULIN (MULTI): ICD-10-CM

## 2024-07-23 DIAGNOSIS — Z79.4 TYPE 2 DIABETES MELLITUS WITHOUT COMPLICATION, WITH LONG-TERM CURRENT USE OF INSULIN (MULTI): ICD-10-CM

## 2024-07-23 PROCEDURE — RXMED WILLOW AMBULATORY MEDICATION CHARGE

## 2024-07-23 RX ORDER — TIRZEPATIDE 10 MG/.5ML
10 INJECTION, SOLUTION SUBCUTANEOUS
Qty: 2 ML | Refills: 2 | Status: SHIPPED | OUTPATIENT
Start: 2024-07-23

## 2024-07-23 NOTE — TELEPHONE ENCOUNTER
Alina called for a refill on her Mounjaro 10 mg - injects one a week.  Please send to her  Pharmacy.

## 2024-07-27 ENCOUNTER — PHARMACY VISIT (OUTPATIENT)
Dept: PHARMACY | Facility: CLINIC | Age: 64
End: 2024-07-27
Payer: COMMERCIAL

## 2024-08-09 ENCOUNTER — HOSPITAL ENCOUNTER (OUTPATIENT)
Dept: RADIOLOGY | Facility: HOSPITAL | Age: 64
Discharge: HOME | End: 2024-08-09
Payer: COMMERCIAL

## 2024-08-09 DIAGNOSIS — Z12.31 ENCOUNTER FOR SCREENING MAMMOGRAM FOR MALIGNANT NEOPLASM OF BREAST: ICD-10-CM

## 2024-08-09 PROCEDURE — 77067 SCR MAMMO BI INCL CAD: CPT

## 2024-08-19 PROCEDURE — RXMED WILLOW AMBULATORY MEDICATION CHARGE

## 2024-08-26 ENCOUNTER — PHARMACY VISIT (OUTPATIENT)
Dept: PHARMACY | Facility: CLINIC | Age: 64
End: 2024-08-26
Payer: COMMERCIAL

## 2024-09-01 ENCOUNTER — APPOINTMENT (OUTPATIENT)
Dept: RADIOLOGY | Facility: HOSPITAL | Age: 64
End: 2024-09-01
Payer: COMMERCIAL

## 2024-09-16 PROCEDURE — RXMED WILLOW AMBULATORY MEDICATION CHARGE

## 2024-09-21 ENCOUNTER — PHARMACY VISIT (OUTPATIENT)
Dept: PHARMACY | Facility: CLINIC | Age: 64
End: 2024-09-21
Payer: COMMERCIAL

## 2024-09-25 ENCOUNTER — APPOINTMENT (OUTPATIENT)
Dept: ENDOCRINOLOGY | Facility: CLINIC | Age: 64
End: 2024-09-25
Payer: COMMERCIAL

## 2024-09-25 VITALS
WEIGHT: 228 LBS | HEIGHT: 65 IN | SYSTOLIC BLOOD PRESSURE: 173 MMHG | BODY MASS INDEX: 37.99 KG/M2 | DIASTOLIC BLOOD PRESSURE: 84 MMHG

## 2024-09-25 DIAGNOSIS — E11.9 TYPE 2 DIABETES MELLITUS WITHOUT COMPLICATION, WITH LONG-TERM CURRENT USE OF INSULIN (MULTI): Primary | ICD-10-CM

## 2024-09-25 DIAGNOSIS — E78.5 DYSLIPIDEMIA: ICD-10-CM

## 2024-09-25 DIAGNOSIS — I10 BENIGN ESSENTIAL HYPERTENSION: ICD-10-CM

## 2024-09-25 DIAGNOSIS — Z79.4 TYPE 2 DIABETES MELLITUS WITHOUT COMPLICATION, WITH LONG-TERM CURRENT USE OF INSULIN (MULTI): Primary | ICD-10-CM

## 2024-09-25 DIAGNOSIS — E11.29 TYPE 2 DIABETES MELLITUS WITH OTHER DIABETIC KIDNEY COMPLICATION: ICD-10-CM

## 2024-09-25 LAB
POC FINGERSTICK BLOOD GLUCOSE: 143 MG/DL (ref 70–100)
POC HEMOGLOBIN A1C: 7.6 % (ref 4.2–6.5)

## 2024-09-25 PROCEDURE — 83036 HEMOGLOBIN GLYCOSYLATED A1C: CPT | Performed by: HOSPITALIST

## 2024-09-25 PROCEDURE — 3079F DIAST BP 80-89 MM HG: CPT | Performed by: HOSPITALIST

## 2024-09-25 PROCEDURE — 82962 GLUCOSE BLOOD TEST: CPT | Performed by: HOSPITALIST

## 2024-09-25 PROCEDURE — 99214 OFFICE O/P EST MOD 30 MIN: CPT | Performed by: HOSPITALIST

## 2024-09-25 PROCEDURE — 3048F LDL-C <100 MG/DL: CPT | Performed by: HOSPITALIST

## 2024-09-25 PROCEDURE — 4010F ACE/ARB THERAPY RXD/TAKEN: CPT | Performed by: HOSPITALIST

## 2024-09-25 PROCEDURE — 95251 CONT GLUC MNTR ANALYSIS I&R: CPT | Performed by: HOSPITALIST

## 2024-09-25 PROCEDURE — 3061F NEG MICROALBUMINURIA REV: CPT | Performed by: HOSPITALIST

## 2024-09-25 PROCEDURE — 3008F BODY MASS INDEX DOCD: CPT | Performed by: HOSPITALIST

## 2024-09-25 PROCEDURE — 3077F SYST BP >= 140 MM HG: CPT | Performed by: HOSPITALIST

## 2024-09-25 PROCEDURE — 3051F HG A1C>EQUAL 7.0%<8.0%: CPT | Performed by: HOSPITALIST

## 2024-09-25 ASSESSMENT — ENCOUNTER SYMPTOMS
DIARRHEA: 0
CONSTITUTIONAL NEGATIVE: 1
LIGHT-HEADEDNESS: 0
NAUSEA: 0
HEADACHES: 0
TROUBLE SWALLOWING: 0
AGITATION: 0
SHORTNESS OF BREATH: 0
EYE ITCHING: 0
ABDOMINAL PAIN: 0
CONSTIPATION: 0
ABDOMINAL DISTENTION: 0
SLEEP DISTURBANCE: 0
VOMITING: 0
CHEST TIGHTNESS: 0
DYSURIA: 0
VOICE CHANGE: 0
PHOTOPHOBIA: 0
NERVOUS/ANXIOUS: 0
ARTHRALGIAS: 0
PALPITATIONS: 0
TREMORS: 0
FREQUENCY: 0
SORE THROAT: 0

## 2024-09-25 NOTE — PROGRESS NOTES
Subjective   Patient ID: Alina Chavarria is a 64 y.o. female who presents for Diabetes (Dx dm: GDM; 1998/PCP: Dr. Darling in [place] /Podiatry: does not see one /Eye exam: twice yearly /Patient using freestyle kaden 2, connected to office, LINKED to office /Patient testing glucose 4 times daily. due to fluctuating blood glucose, hypoglycemia and/4 insulin injections daily. Patient is injecting meal time insulin 3 times daily based on glucose reading and sliding scale/5/28/24 hga1c 7.8%) and Hypothyroidism (Current regimen: NOEL Synthroid 112 mcg every day; /Takes thyroid medication correctly).  Lab Results   Component Value Date    HGBA1C 7.6 (A) 09/25/2024      HPI    Review of Systems   Constitutional: Negative.    HENT:  Negative for sore throat, trouble swallowing and voice change.    Eyes:  Negative for photophobia, itching and visual disturbance.   Respiratory:  Negative for chest tightness and shortness of breath.    Cardiovascular:  Negative for chest pain and palpitations.   Gastrointestinal:  Negative for abdominal distention, abdominal pain, constipation, diarrhea, nausea and vomiting.   Endocrine: Negative for cold intolerance, heat intolerance and polyuria.   Genitourinary:  Negative for dysuria and frequency.   Musculoskeletal:  Negative for arthralgias.   Skin:  Negative for pallor.   Allergic/Immunologic: Negative for environmental allergies.   Neurological:  Negative for tremors, light-headedness and headaches.   Psychiatric/Behavioral:  Negative for agitation and sleep disturbance. The patient is not nervous/anxious.        Objective   Physical Exam  Constitutional:       Appearance: Normal appearance.   HENT:      Head: Normocephalic.      Nose: Nose normal.      Mouth/Throat:      Mouth: Mucous membranes are moist.   Eyes:      Extraocular Movements: Extraocular movements intact.   Cardiovascular:      Rate and Rhythm: Normal rate.   Pulmonary:      Effort: Pulmonary effort is normal. No  "respiratory distress.   Abdominal:      General: There is no distension.   Musculoskeletal:         General: Normal range of motion.      Cervical back: Normal range of motion and neck supple.   Skin:     General: Skin is warm and dry.   Neurological:      Mental Status: She is alert and oriented to person, place, and time.   Psychiatric:         Mood and Affect: Mood normal.    Visit Vitals  /84   Ht 1.651 m (5' 5\")   Wt 103 kg (228 lb)   LMP  (LMP Unknown)   BMI 37.94 kg/m²   OB Status Postmenopausal   BSA 2.17 m²        Assessment/Plan   Diagnoses and all orders for this visit:  Type 2 diabetes mellitus without complication, with long-term current use of insulin (Multi)  Type 2 diabetes mellitus with other diabetic kidney complication (Multi)  -     POCT glucose manually resulted  -     POCT glycosylated hemoglobin (Hb A1C) manually resulted  Dyslipidemia  Benign essential hypertension       64 yo T2 DM ,gestational DM in '91.     Current regimen:    Toujeo 40 -0-0-0  Metformin 1g BID  Humalog 8-6-10-0 +2 units per 50>150 ( takes differently, misses lunch if she is only eating salad),   mounajro 10 mg weekly ( started sept 2023  ) lost ~20 lbs since 9/2023-mild constipation hemorrhoids     She did had abd pain and was in ED after starting on mounjaro in 10/2023 but was found to have KIDNEY stones   No SE currently no mounjaro  she takes humalog before eating. when BG < 90 doesn't take humalog     Past medications:    Rybelsus - stopped due to SE on 14 mg dose , we tried 3 mg and still had a lot of GI issues mostly diarrhea   did not tolerate SGLT-2 inhibitors due to yeast infections previously.     Pt continues using the Freestyle Ney 2 CGM, data reviewed today on her phone which was reviewed   She has significant day to day fluctuation. active time 95%, TIR 43% ,occ hypoglycemia     HgA1c 7.8 % and above goal      - INCREASE Toujeo 40-0-0-0  - take humalog 15 min before eating. Advised 2 extra units " humalog before dinner   - discussed again SE of GLP 1 agonist  - discussed jardiance ( denies uti/yeast infections in past.   Discussed mechanism of action, side effects including urine infection, vaginal yeast infection, euglycemic DKA, dehydration    -Start Jardiance 10 mg daily.  Stop Humalog.  Increase oral hydration to at least 64 ounces of water a day  Renal function panel in 3 weeks.    Continue same Toujeo and Mounjaro.    -Will not increase the Mounjaro dose given constipation can take Benefiber or MiraLAX over-the-counter.  Can use over-the-counter hemorrhoid cream   In past discussed take half insulin if meal has low carb like salad. < 30 g carbs or if low BG  - discussed lifestyle modifications.  - will discuss GVOKe next visit    Call in 2 weeks with 4 days of sugar reading before food and bedtime.     -eye exam UTD   -negative microalbumin  -creatinine normal  -LDL and non-HDL cholesterol around goal, pt declined statin in past  -Pt instructed to call office with any hypoglycemia, hyperglycemia, or any other concerns     # hypothyroidism on replacement.  Pt is currently taking Synthroid 112mcg daily.  Pt clinically and biochemically euthyroid, continue same      RTC 4m          SH-  on mounjaro as well with great control of DM.

## 2024-10-16 DIAGNOSIS — E11.9 TYPE 2 DIABETES MELLITUS WITHOUT COMPLICATION, WITH LONG-TERM CURRENT USE OF INSULIN (MULTI): ICD-10-CM

## 2024-10-16 DIAGNOSIS — Z79.4 TYPE 2 DIABETES MELLITUS WITHOUT COMPLICATION, WITH LONG-TERM CURRENT USE OF INSULIN (MULTI): ICD-10-CM

## 2024-10-16 PROCEDURE — RXMED WILLOW AMBULATORY MEDICATION CHARGE

## 2024-10-16 RX ORDER — TIRZEPATIDE 10 MG/.5ML
10 INJECTION, SOLUTION SUBCUTANEOUS
Qty: 6 ML | Refills: 1 | Status: SHIPPED | OUTPATIENT
Start: 2024-10-16

## 2024-10-17 ENCOUNTER — PHARMACY VISIT (OUTPATIENT)
Dept: PHARMACY | Facility: CLINIC | Age: 64
End: 2024-10-17
Payer: COMMERCIAL

## 2024-10-17 ENCOUNTER — LAB (OUTPATIENT)
Dept: LAB | Facility: LAB | Age: 64
End: 2024-10-17
Payer: COMMERCIAL

## 2024-10-17 DIAGNOSIS — Z79.4 TYPE 2 DIABETES MELLITUS WITHOUT COMPLICATION, WITH LONG-TERM CURRENT USE OF INSULIN (MULTI): ICD-10-CM

## 2024-10-17 DIAGNOSIS — E11.9 TYPE 2 DIABETES MELLITUS WITHOUT COMPLICATION, WITH LONG-TERM CURRENT USE OF INSULIN (MULTI): ICD-10-CM

## 2024-10-17 LAB
ALBUMIN SERPL BCP-MCNC: 4.3 G/DL (ref 3.4–5)
ANION GAP SERPL CALC-SCNC: 14 MMOL/L (ref 10–20)
BUN SERPL-MCNC: 10 MG/DL (ref 6–23)
CALCIUM SERPL-MCNC: 9.1 MG/DL (ref 8.6–10.3)
CHLORIDE SERPL-SCNC: 107 MMOL/L (ref 98–107)
CO2 SERPL-SCNC: 27 MMOL/L (ref 21–32)
CREAT SERPL-MCNC: 0.6 MG/DL (ref 0.5–1.05)
EGFRCR SERPLBLD CKD-EPI 2021: >90 ML/MIN/1.73M*2
GLUCOSE SERPL-MCNC: 181 MG/DL (ref 74–99)
PHOSPHATE SERPL-MCNC: 3.7 MG/DL (ref 2.5–4.9)
POTASSIUM SERPL-SCNC: 4.9 MMOL/L (ref 3.5–5.3)
SODIUM SERPL-SCNC: 143 MMOL/L (ref 136–145)

## 2024-10-17 PROCEDURE — 36415 COLL VENOUS BLD VENIPUNCTURE: CPT

## 2024-10-17 PROCEDURE — 80069 RENAL FUNCTION PANEL: CPT

## 2024-10-23 DIAGNOSIS — Z79.4 TYPE 2 DIABETES MELLITUS WITHOUT COMPLICATION, WITH LONG-TERM CURRENT USE OF INSULIN (MULTI): Primary | ICD-10-CM

## 2024-10-23 DIAGNOSIS — E11.9 TYPE 2 DIABETES MELLITUS WITHOUT COMPLICATION, WITH LONG-TERM CURRENT USE OF INSULIN (MULTI): Primary | ICD-10-CM

## 2024-10-23 RX ORDER — FLUCONAZOLE 150 MG/1
150 TABLET ORAL ONCE
Qty: 1 TABLET | Refills: 0 | Status: SHIPPED | OUTPATIENT
Start: 2024-10-23 | End: 2024-10-23

## 2024-11-04 DIAGNOSIS — E11.9 TYPE 2 DIABETES MELLITUS WITHOUT COMPLICATION, WITH LONG-TERM CURRENT USE OF INSULIN (MULTI): Primary | ICD-10-CM

## 2024-11-04 DIAGNOSIS — Z79.4 TYPE 2 DIABETES MELLITUS WITHOUT COMPLICATION, WITH LONG-TERM CURRENT USE OF INSULIN (MULTI): Primary | ICD-10-CM

## 2024-11-04 RX ORDER — FLUCONAZOLE 150 MG/1
150 TABLET ORAL ONCE
Qty: 1 TABLET | Refills: 0 | Status: SHIPPED | OUTPATIENT
Start: 2024-11-04 | End: 2024-11-04

## 2024-11-06 ENCOUNTER — TELEMEDICINE (OUTPATIENT)
Dept: ENDOCRINOLOGY | Facility: CLINIC | Age: 64
End: 2024-11-06
Payer: COMMERCIAL

## 2024-11-06 DIAGNOSIS — E11.9 TYPE 2 DIABETES MELLITUS WITHOUT COMPLICATION, WITH LONG-TERM CURRENT USE OF INSULIN (MULTI): Primary | ICD-10-CM

## 2024-11-06 DIAGNOSIS — E78.5 DYSLIPIDEMIA: ICD-10-CM

## 2024-11-06 DIAGNOSIS — Z79.4 TYPE 2 DIABETES MELLITUS WITHOUT COMPLICATION, WITH LONG-TERM CURRENT USE OF INSULIN (MULTI): Primary | ICD-10-CM

## 2024-11-06 DIAGNOSIS — B37.31 VAGINAL YEAST INFECTION: ICD-10-CM

## 2024-11-06 PROCEDURE — RXMED WILLOW AMBULATORY MEDICATION CHARGE

## 2024-11-06 PROCEDURE — 95251 CONT GLUC MNTR ANALYSIS I&R: CPT | Performed by: HOSPITALIST

## 2024-11-06 PROCEDURE — 3048F LDL-C <100 MG/DL: CPT | Performed by: HOSPITALIST

## 2024-11-06 PROCEDURE — 3061F NEG MICROALBUMINURIA REV: CPT | Performed by: HOSPITALIST

## 2024-11-06 PROCEDURE — 99443 PR PHYS/QHP TELEPHONE EVALUATION 21-30 MIN: CPT | Performed by: HOSPITALIST

## 2024-11-06 PROCEDURE — 3051F HG A1C>EQUAL 7.0%<8.0%: CPT | Performed by: HOSPITALIST

## 2024-11-06 RX ORDER — TIRZEPATIDE 12.5 MG/.5ML
12.5 INJECTION, SOLUTION SUBCUTANEOUS
Qty: 6 ML | Refills: 1 | Status: SHIPPED | OUTPATIENT
Start: 2024-11-06

## 2024-11-06 RX ORDER — GLIPIZIDE 5 MG/1
2.5 TABLET ORAL
Qty: 45 TABLET | Refills: 0 | Status: SHIPPED | OUTPATIENT
Start: 2024-11-06 | End: 2025-02-06

## 2024-11-06 ASSESSMENT — ENCOUNTER SYMPTOMS
NAUSEA: 0
CONSTITUTIONAL NEGATIVE: 1
VOMITING: 0
HEADACHES: 0
DYSURIA: 0
SHORTNESS OF BREATH: 0
NERVOUS/ANXIOUS: 0
ARTHRALGIAS: 0
ABDOMINAL DISTENTION: 0
TREMORS: 0
AGITATION: 0
SORE THROAT: 0
CONSTIPATION: 0
FREQUENCY: 0
CHEST TIGHTNESS: 0
LIGHT-HEADEDNESS: 0
PALPITATIONS: 0
VOICE CHANGE: 0
SLEEP DISTURBANCE: 0
PHOTOPHOBIA: 0
DIARRHEA: 0
EYE ITCHING: 0
TROUBLE SWALLOWING: 0
ABDOMINAL PAIN: 0

## 2024-11-06 NOTE — PROGRESS NOTES
Subjective   Patient ID: Alina Chavarria is a 64 y.o. female who presents for Diabetes (VIRTUAL VISIT::: discuss Jardiance- having yeast infections/Dx dm: GDM; 1998/PCP: Dr. Darling in [place] /Podiatry: does not see one /Eye exam: twice yearly /Patient using freestyle kdaen 2, connected to office, LINKED to office /Patient testing glucose 4 times daily. due to fluctuating blood glucose, hypoglycemia and/4 insulin injections daily. Patient is injecting meal time insulin 3 times daily based on glucose reading and sliding scale) and Hypothyroidism (Current regimen: NOEL Synthroid 112 mcg every day; /Takes thyroid medication correctly).  Lab Results   Component Value Date    TSH 1.17 05/28/2024      Lab Results   Component Value Date    HGBA1C 7.6 (A) 09/25/2024      HPI   See AP     Review of Systems   Constitutional: Negative.    HENT:  Negative for sore throat, trouble swallowing and voice change.    Eyes:  Negative for photophobia, itching and visual disturbance.   Respiratory:  Negative for chest tightness and shortness of breath.    Cardiovascular:  Negative for chest pain and palpitations.   Gastrointestinal:  Negative for abdominal distention, abdominal pain, constipation, diarrhea, nausea and vomiting.   Endocrine: Negative for cold intolerance, heat intolerance and polyuria.   Genitourinary:  Positive for vaginal pain. Negative for dysuria and frequency.   Musculoskeletal:  Negative for arthralgias.   Skin:  Negative for pallor.   Allergic/Immunologic: Negative for environmental allergies.   Neurological:  Negative for tremors, light-headedness and headaches.   Psychiatric/Behavioral:  Negative for agitation and sleep disturbance. The patient is not nervous/anxious.        Objective   Physical Exam NO vitals due to virtual visit     Assessment/Plan   Diagnoses and all orders for this visit:  Type 2 diabetes mellitus without complication, with long-term current use of insulin (Multi)  -     empagliflozin  (Jardiance) 25 mg; Take 0.5 tablets (12.5 mg) by mouth once daily.  -     tirzepatide (Mounjaro) 12.5 mg/0.5 mL pen injector; Inject 12.5 mg under the skin every 7 days.  -     glipiZIDE (Glucotrol) 5 mg tablet; Take 0.5 tablets (2.5 mg) by mouth once daily in the evening. Take with meals.  Vaginal yeast infection  Dyslipidemia           65 yo T2 DM ,gestational DM in '91.     Current regimen:    Toujeo 40 -0-0-0  Metformin 1g BID  Jardiance 25 mg daily (9/2024 (current yeast infection not improving) lost additional 10 pounds after starting Jardiance  mounajro 10 mg weekly ( started sept 2023  ) lost ~20 lbs since 9/2023-mild constipation hemorrhoids     She did had abd pain and was in ED after starting on mounjaro in 10/2023 but was found to have KIDNEY stones   No SE currently no mounjaro    Past medication:   Not taking Humalog anymore.  After starting Jardiance.  Hesitant to stop Jardiance given it has been helping with blood sugar control   Rybelsus - stopped due to SE on 14 mg dose , we tried 3 mg and still had a lot of GI issues mostly diarrhea   did not tolerate SGLT-2 inhibitors due to yeast infections previously.     Pt continues using the Freestyle Ney 2 CGM, data reviewed today on her phone which was reviewed   She has significant day to day fluctuation. active time 94 %, TIR 4 2 % ,occ hypoglycemia     HgA1c not done     -Stop Jardiance for 1 week until vaginal yeast infection improved.  Took fluconazole yesterday.  Resume Jardiance at lower dose 10 mg or half tablet of 25 mg once daily  Start glipizide 2.5 mg before dinner if blood sugar more than 220 after dinner  Increase Mounjaro to 12.5 mg once weekly.  Discussed side effects.  Continue to take Metamucil for constipation  Discussed side effects in detail again  Continue same Toujeo  In future if needed will go up on glipizide dose for blood sugar control instead of restarting Humalog    Discussed mechanism of action, side effects of Jardiance  including urine infection, vaginal yeast infection, euglycemic DKA, dehydration    In past discussed take half insulin if meal has low carb like salad. < 30 g carbs or if low BG  - discussed lifestyle modifications.  - will discuss GVOKe next visit    Call in 2 weeks with update on her symptoms and blood sugar control     -eye exam UTD   -negative microalbumin  -creatinine normal  -LDL and non-HDL cholesterol around goal, pt declined statin in past  -Pt instructed to call office with any hypoglycemia, hyperglycemia, or any other concerns     # hypothyroidism on replacement.  Did not discuss during this visit  Pt is currently taking Synthroid 112mcg daily.      RTC 4m          SH-  on mounjaro as well with great control of DM.

## 2024-11-08 ENCOUNTER — PHARMACY VISIT (OUTPATIENT)
Dept: PHARMACY | Facility: CLINIC | Age: 64
End: 2024-11-08
Payer: COMMERCIAL

## 2024-11-22 ENCOUNTER — TELEPHONE (OUTPATIENT)
Dept: ENDOCRINOLOGY | Facility: CLINIC | Age: 64
End: 2024-11-22
Payer: COMMERCIAL

## 2024-11-22 DIAGNOSIS — E11.9 TYPE 2 DIABETES MELLITUS WITHOUT COMPLICATION, WITH LONG-TERM CURRENT USE OF INSULIN (MULTI): Primary | ICD-10-CM

## 2024-11-22 DIAGNOSIS — Z79.4 TYPE 2 DIABETES MELLITUS WITHOUT COMPLICATION, WITH LONG-TERM CURRENT USE OF INSULIN (MULTI): Primary | ICD-10-CM

## 2024-11-22 NOTE — TELEPHONE ENCOUNTER
Good afternoon ~ I called my pharmacy to refill this prescription but was told it was cancelled.  Dr. Schultz increased Jardiance to 25 mg but I was having uncomfortable side effects.  I am back on the 10 mg but only have one pill left.     Please refill this prescription for  at [facility] in [place], [place].

## 2025-01-09 DIAGNOSIS — E11.9 TYPE 2 DIABETES MELLITUS WITHOUT COMPLICATION, WITH LONG-TERM CURRENT USE OF INSULIN (MULTI): ICD-10-CM

## 2025-01-09 DIAGNOSIS — Z79.4 TYPE 2 DIABETES MELLITUS WITHOUT COMPLICATION, WITH LONG-TERM CURRENT USE OF INSULIN (MULTI): ICD-10-CM

## 2025-01-09 RX ORDER — INSULIN GLARGINE 300 U/ML
INJECTION, SOLUTION SUBCUTANEOUS
Qty: 36 ML | Refills: 0 | Status: SHIPPED | OUTPATIENT
Start: 2025-01-09

## 2025-01-14 DIAGNOSIS — B37.31 VAGINAL YEAST INFECTION: Primary | ICD-10-CM

## 2025-01-14 RX ORDER — FLUCONAZOLE 150 MG/1
150 TABLET ORAL ONCE
Qty: 1 TABLET | Refills: 0 | Status: SHIPPED | OUTPATIENT
Start: 2025-01-14 | End: 2025-01-14

## 2025-01-24 PROCEDURE — RXMED WILLOW AMBULATORY MEDICATION CHARGE

## 2025-01-25 ENCOUNTER — PHARMACY VISIT (OUTPATIENT)
Dept: PHARMACY | Facility: CLINIC | Age: 65
End: 2025-01-25
Payer: COMMERCIAL

## 2025-01-29 DIAGNOSIS — Z79.4 TYPE 2 DIABETES MELLITUS WITHOUT COMPLICATION, WITH LONG-TERM CURRENT USE OF INSULIN (MULTI): ICD-10-CM

## 2025-01-29 DIAGNOSIS — E03.9 HYPOTHYROIDISM, UNSPECIFIED TYPE: ICD-10-CM

## 2025-01-29 DIAGNOSIS — E78.5 DYSLIPIDEMIA: ICD-10-CM

## 2025-01-29 DIAGNOSIS — E11.9 TYPE 2 DIABETES MELLITUS WITHOUT COMPLICATION, WITH LONG-TERM CURRENT USE OF INSULIN (MULTI): ICD-10-CM

## 2025-01-29 RX ORDER — BLOOD-GLUCOSE SENSOR
EACH MISCELLANEOUS
Qty: 9 EACH | Refills: 1 | Status: SHIPPED | OUTPATIENT
Start: 2025-01-29

## 2025-02-19 ENCOUNTER — APPOINTMENT (OUTPATIENT)
Dept: ENDOCRINOLOGY | Facility: CLINIC | Age: 65
End: 2025-02-19
Payer: COMMERCIAL

## 2025-02-19 VITALS
HEIGHT: 65 IN | WEIGHT: 196 LBS | DIASTOLIC BLOOD PRESSURE: 78 MMHG | SYSTOLIC BLOOD PRESSURE: 162 MMHG | BODY MASS INDEX: 32.65 KG/M2

## 2025-02-19 DIAGNOSIS — Z79.4 TYPE 2 DIABETES MELLITUS WITHOUT COMPLICATION, WITH LONG-TERM CURRENT USE OF INSULIN (MULTI): Primary | ICD-10-CM

## 2025-02-19 DIAGNOSIS — E78.5 DYSLIPIDEMIA: ICD-10-CM

## 2025-02-19 DIAGNOSIS — E11.9 TYPE 2 DIABETES MELLITUS WITHOUT COMPLICATION, WITH LONG-TERM CURRENT USE OF INSULIN (MULTI): Primary | ICD-10-CM

## 2025-02-19 DIAGNOSIS — E03.9 HYPOTHYROIDISM, UNSPECIFIED TYPE: ICD-10-CM

## 2025-02-19 LAB
POC FINGERSTICK BLOOD GLUCOSE: 159 MG/DL (ref 70–100)
POC HEMOGLOBIN A1C: 8.3 % (ref 4.2–6.5)

## 2025-02-19 PROCEDURE — 3078F DIAST BP <80 MM HG: CPT | Performed by: HOSPITALIST

## 2025-02-19 PROCEDURE — 3008F BODY MASS INDEX DOCD: CPT | Performed by: HOSPITALIST

## 2025-02-19 PROCEDURE — 3077F SYST BP >= 140 MM HG: CPT | Performed by: HOSPITALIST

## 2025-02-19 PROCEDURE — 99214 OFFICE O/P EST MOD 30 MIN: CPT | Performed by: HOSPITALIST

## 2025-02-19 PROCEDURE — 82962 GLUCOSE BLOOD TEST: CPT | Performed by: HOSPITALIST

## 2025-02-19 PROCEDURE — 95251 CONT GLUC MNTR ANALYSIS I&R: CPT | Performed by: HOSPITALIST

## 2025-02-19 PROCEDURE — 83036 HEMOGLOBIN GLYCOSYLATED A1C: CPT | Performed by: HOSPITALIST

## 2025-02-19 RX ORDER — GLIPIZIDE 2.5 MG/1
2.5 TABLET, EXTENDED RELEASE ORAL
Qty: 90 TABLET | Refills: 1 | Status: SHIPPED | OUTPATIENT
Start: 2025-02-19 | End: 2025-08-18

## 2025-02-19 ASSESSMENT — ENCOUNTER SYMPTOMS
CHEST TIGHTNESS: 0
LIGHT-HEADEDNESS: 0
VOMITING: 0
CONSTITUTIONAL NEGATIVE: 1
TREMORS: 0
VOICE CHANGE: 0
FREQUENCY: 0
SORE THROAT: 0
PALPITATIONS: 0
NERVOUS/ANXIOUS: 0
PHOTOPHOBIA: 0
AGITATION: 0
DYSURIA: 0
HEADACHES: 0
NAUSEA: 0
DIARRHEA: 0
SLEEP DISTURBANCE: 0
EYE ITCHING: 0
SHORTNESS OF BREATH: 0
CONSTIPATION: 0
ARTHRALGIAS: 0
ABDOMINAL PAIN: 0
TROUBLE SWALLOWING: 0
ABDOMINAL DISTENTION: 0

## 2025-02-19 NOTE — PROGRESS NOTES
Subjective   Patient ID: Alina Chavarria is a 64 y.o. female who presents for Diabetes ((Dx dm: GDM; 1998/PCP: Dr. Darling in [place] /Podiatry: does not see one /Eye exam: twice yearly /Patient using freestyle kaden /Patient testing glucose 4 times daily. due to fluctuating blood glucose, hypoglycemia and/4 insulin injections daily. Patient is injecting meal time insulin 3 times daily based on glucose reading and sliding scale Compliant with diabetic regime. Pt is adherent and benefiting from CGM treatment plan / Hypothyroidism NOEL Synthroid 112 mcg every day; /Takes correctly).).  HPI   Lab Results   Component Value Date    HGBA1C 8.3 (A) 02/19/2025       See AP     Review of Systems   Constitutional: Negative.    HENT:  Negative for sore throat, trouble swallowing and voice change.    Eyes:  Negative for photophobia, itching and visual disturbance.   Respiratory:  Negative for chest tightness and shortness of breath.    Cardiovascular:  Negative for chest pain and palpitations.   Gastrointestinal:  Negative for abdominal distention, abdominal pain, constipation, diarrhea, nausea and vomiting.   Endocrine: Negative for cold intolerance, heat intolerance and polyuria.   Genitourinary:  Negative for dysuria and frequency.   Musculoskeletal:  Negative for arthralgias.   Skin:  Negative for pallor.   Allergic/Immunologic: Negative for environmental allergies.   Neurological:  Negative for tremors, light-headedness and headaches.   Psychiatric/Behavioral:  Negative for agitation and sleep disturbance. The patient is not nervous/anxious.        Objective   Physical Exam  Constitutional:       Appearance: Normal appearance.   HENT:      Head: Normocephalic.      Nose: Nose normal.      Mouth/Throat:      Mouth: Mucous membranes are moist.   Eyes:      Extraocular Movements: Extraocular movements intact.   Cardiovascular:      Rate and Rhythm: Normal rate.   Pulmonary:      Effort: Pulmonary effort is normal. No  "respiratory distress.   Abdominal:      General: There is no distension.   Musculoskeletal:         General: Normal range of motion.      Cervical back: Normal range of motion and neck supple.   Skin:     General: Skin is warm and dry.   Neurological:      Mental Status: She is alert and oriented to person, place, and time.   Psychiatric:         Mood and Affect: Mood normal.      Visit Vitals  /78   Ht 1.651 m (5' 5\")   Wt 88.9 kg (196 lb)   LMP  (LMP Unknown)   BMI 32.62 kg/m²   OB Status Postmenopausal   BSA 2.02 m²        Assessment/Plan   Diagnoses and all orders for this visit:  Type 2 diabetes mellitus without complication, with long-term current use of insulin (Multi)  -     POCT glycosylated hemoglobin (Hb A1C) manually resulted  -     POCT fingerstick glucose manually resulted  -     glipiZIDE XL (Glucotrol XL) 2.5 mg 24 hr tablet; Take 1 tablet (2.5 mg) by mouth once daily with breakfast. Do not crush, chew, or split.  Hypothyroidism, unspecified type  Dyslipidemia              63 yo T2 DM ,gestational DM in '91.     Current regimen:    Toujeo 40 -0-0-0  Metformin 1g BID  [id number]  mg daily (9/2024 (mild yeast infection not improving) lost additional 10 pounds after starting Jardiance  Mounajro 12.5mg weekly ( started sept 2023  ) lost ~20 lbs since 9/2023-mild constipation hemorrhoids     She did had abd pain and was in ED after starting on mounjaro in 10/2023 but was found to have KIDNEY stones   No SE currently on mounjaro.  She does feel full    Past medication:   Not taking Humalog anymore.  After starting Jardiance.  Hesitant to stop Jardiance given it has been helping with blood sugar control   Rybelsus - stopped due to SE on 14 mg dose , we tried 3 mg and still had a lot of GI issues mostly diarrhea   did not tolerate SGLT-2 inhibitors due to yeast infections previously.     Pt continues using the Freestyle Ney 2 CGM, data reviewed today on her phone which was reviewed   She has " significant day to day fluctuation. active time 92 %, TIR 45 % ,occ hypoglycemia     HgA1c worsened above goal  Continues to have mild vaginal yeast infections on and off      Plan  She does not want to start Jardiance at this time given it has helped her blood sugar control and her weight.  Discussed side effects in detail again.  Discussed adding probiotics and changing her undergarments to cotton material  Advised to call if symptoms worse.  Increase oral hydration  Continue Jardiance 10 mg daily she understand risks.  Continue Mounjaro same dose.  Will not increase the dose more given very mild side effects at this time  Add glipizide 2.5 mg extended release in the morning.  Discussed risk of hypoglycemia  Decrease Toujeo to 38 units.  If any sugar less than 90 in the morning decrease Toujeo again after 36 units  discussed mechanism of action, side effects of Jardiance including urine infection, vaginal yeast infection, euglycemic DKA, dehydration      - discussed lifestyle modifications.  - will discuss GVOKe next visit  -eye exam UTD   -negative microalbumin  -creatinine normal  -LDL and non-HDL cholesterol around goal, pt declined statin in past  -Pt instructed to call office with any hypoglycemia, hyperglycemia, or any other concerns     # hypothyroidism on replacement.    Pt is currently taking Synthroid 112mcg daily.      RTC 4m          SH-  on mounjaro as well with great control of DM.

## 2025-02-25 PROCEDURE — RXMED WILLOW AMBULATORY MEDICATION CHARGE

## 2025-02-27 ENCOUNTER — TELEPHONE (OUTPATIENT)
Dept: ENDOCRINOLOGY | Facility: CLINIC | Age: 65
End: 2025-02-27
Payer: COMMERCIAL

## 2025-02-27 DIAGNOSIS — Z79.4 TYPE 2 DIABETES MELLITUS WITHOUT COMPLICATION, WITH LONG-TERM CURRENT USE OF INSULIN (MULTI): ICD-10-CM

## 2025-02-27 DIAGNOSIS — E11.9 TYPE 2 DIABETES MELLITUS WITHOUT COMPLICATION, WITH LONG-TERM CURRENT USE OF INSULIN (MULTI): ICD-10-CM

## 2025-02-27 NOTE — TELEPHONE ENCOUNTER
Good morning ~ I need a refill called into [facility] in [place] for my Jardiance 10 mg prescription.

## 2025-02-28 ENCOUNTER — PHARMACY VISIT (OUTPATIENT)
Dept: PHARMACY | Facility: CLINIC | Age: 65
End: 2025-02-28
Payer: COMMERCIAL

## 2025-03-21 PROCEDURE — RXMED WILLOW AMBULATORY MEDICATION CHARGE

## 2025-03-24 ENCOUNTER — PHARMACY VISIT (OUTPATIENT)
Dept: PHARMACY | Facility: CLINIC | Age: 65
End: 2025-03-24
Payer: COMMERCIAL

## 2025-03-24 DIAGNOSIS — E11.9 TYPE 2 DIABETES MELLITUS WITHOUT COMPLICATION, WITH LONG-TERM CURRENT USE OF INSULIN: ICD-10-CM

## 2025-03-24 DIAGNOSIS — Z79.4 TYPE 2 DIABETES MELLITUS WITHOUT COMPLICATION, WITH LONG-TERM CURRENT USE OF INSULIN: ICD-10-CM

## 2025-03-24 RX ORDER — GLIPIZIDE 2.5 MG/1
2.5 TABLET, EXTENDED RELEASE ORAL
Qty: 90 TABLET | Refills: 1 | Status: SHIPPED | OUTPATIENT
Start: 2025-03-24 | End: 2025-03-26

## 2025-03-26 DIAGNOSIS — Z79.4 TYPE 2 DIABETES MELLITUS WITHOUT COMPLICATION, WITH LONG-TERM CURRENT USE OF INSULIN: ICD-10-CM

## 2025-03-26 DIAGNOSIS — E11.9 TYPE 2 DIABETES MELLITUS WITHOUT COMPLICATION, WITH LONG-TERM CURRENT USE OF INSULIN: ICD-10-CM

## 2025-03-26 RX ORDER — TIRZEPATIDE 12.5 MG/.5ML
12.5 INJECTION, SOLUTION SUBCUTANEOUS
Qty: 6 ML | Refills: 1 | Status: SHIPPED | OUTPATIENT
Start: 2025-03-26

## 2025-03-26 RX ORDER — FLUCONAZOLE 150 MG/1
150 TABLET ORAL ONCE
Qty: 1 TABLET | Refills: 0 | Status: SHIPPED | OUTPATIENT
Start: 2025-03-26 | End: 2025-03-26

## 2025-03-26 RX ORDER — GLIPIZIDE 2.5 MG/1
5 TABLET, EXTENDED RELEASE ORAL
Qty: 90 TABLET | Refills: 1 | Status: SHIPPED | OUTPATIENT
Start: 2025-03-26 | End: 2025-09-22

## 2025-04-11 DIAGNOSIS — E11.29 TYPE 2 DIABETES MELLITUS WITH OTHER DIABETIC KIDNEY COMPLICATION: ICD-10-CM

## 2025-04-11 DIAGNOSIS — E11.9 TYPE 2 DIABETES MELLITUS WITHOUT COMPLICATION, WITHOUT LONG-TERM CURRENT USE OF INSULIN: Primary | ICD-10-CM

## 2025-04-11 RX ORDER — PEN NEEDLE, DIABETIC 30 GX3/16"
NEEDLE, DISPOSABLE MISCELLANEOUS
Qty: 400 EACH | Refills: 1 | Status: SHIPPED | OUTPATIENT
Start: 2025-04-11

## 2025-04-11 RX ORDER — INSULIN LISPRO 100 [IU]/ML
INJECTION, SOLUTION INTRAVENOUS; SUBCUTANEOUS
Qty: 45 ML | Refills: 0 | Status: SHIPPED | OUTPATIENT
Start: 2025-04-11

## 2025-04-21 ENCOUNTER — APPOINTMENT (OUTPATIENT)
Dept: PHARMACY | Facility: HOSPITAL | Age: 65
End: 2025-04-21
Payer: COMMERCIAL

## 2025-04-21 DIAGNOSIS — E11.9 TYPE 2 DIABETES MELLITUS WITHOUT COMPLICATION, WITHOUT LONG-TERM CURRENT USE OF INSULIN: ICD-10-CM

## 2025-04-21 NOTE — PROGRESS NOTES
Patient ID: Alina Chavarria is a 64 y.o. female who presents for Diabetes.    Referring Provider: Lizeth Ward MD  PCP: Meredith Darling MD       Subjective   Preferred pharmacy: [facility] except for M[facility]o to [facility]   Can patient afford prescribed medications: Yes, but does note that Mounjaro and Glipizide XL are expensive     Diabetes  She has type 2 diabetes mellitus. Her disease course has been worsening (Last HbA1c up to 8.3%). Current diabetic treatments: Toujeo 40 units daily, metfomrin 1g BID, Mounjaro 12.5 mg weekly, and glipizide XL 5 mg daily. She has not started Humalog recommended by endocrinology yet. (Patient currently using Freestyle kaden 2 to monitor blood sugars. Her blood sugars have been largely uncontrolled recently with multiple days in 300s after lunch. However, these have also been complicated by lows overnight into the 50-60s.  )       Current diet:   Overall: eats light breakfast and two larger meals a day, but overall is eating less due to lower appetite with Mounjaro   Breakfast (7am): Toast + fruit  Lunch (11-12): sandwich, or salad, pretzels  Dinner (after 5 pm): protein, vegetable, salad     Current exercise: none - works full-time, occasionally walking dogs     Patient is using: continuous glucose monitor    Hypoglycemia frequency: Multiple in last week   Hypoglycemia awareness: Yes     Past pharmacotherapy:  Jardiance - stopped due to multiple yeast infections                       Objective     Primary/Secondary Prevention   - Statin? No  - ACE-I/ARB? Yes  - Aspirin? No    Pertinent PMH Review:  - PMH of Pancreatitis: No  - PMH of Retinopathy: No  - PMH of MTC: No    LMP  (LMP Unknown)    BP Readings from Last 4 Encounters:   02/19/25 162/78   09/25/24 173/84   12/12/23 167/84   10/07/23 150/73      There were no vitals filed for this visit.     Labs  Lab Results   Component Value Date    BILITOT 0.5 05/28/2024    CALCIUM 9.1 10/17/2024    CO2 27 10/17/2024      "10/17/2024    CREATININE 0.60 10/17/2024    GLUCOSE 181 (H) 10/17/2024    ALKPHOS 79 05/28/2024    K 4.9 10/17/2024    PROT 6.3 (L) 05/28/2024     10/17/2024    AST 10 05/28/2024    ALT 12 05/28/2024    BUN 10 10/17/2024    ANIONGAP 14 10/17/2024    PHOS 3.7 10/17/2024    ALBUMIN 4.3 10/17/2024    AMYLASE 25 (L) 05/28/2024    LIPASE 18 05/28/2024    GFRF >90 09/19/2023     Lab Results   Component Value Date    TRIG 94 05/28/2024    CHOL 156 05/28/2024    LDLCALC 72 05/28/2024    HDL 65.2 05/28/2024     Lab Results   Component Value Date    HGBA1C 8.3 (A) 02/19/2025       Current Outpatient Medications   Medication Instructions    blood-glucose sensor (FreeStyle Ney 3 Plus Sensor) device Change sensor every 15 days    flash glucose sensor kit (FreeStyle Ney 2 Sensor) kit Change sensor EVERY 14 days    glipiZIDE XL (GLUCOTROL XL) 5 mg, oral, Daily with breakfast, Do not crush, chew, or split.    insulin lispro (HumaLOG KwikPen Insulin) 100 unit/mL pen Start humalog 4 units for breakfast, 4 units for lunch and 6 units before dinner plus sliding scale (MDD 44 units daily)    metFORMIN (GLUCOPHAGE) 1,000 mg, oral, 2 times daily (morning and late afternoon)    Mounjaro 12.5 mg, subcutaneous, Every 7 days    olmesartan (BENICAR) 40 mg, oral, Daily    pen needle, diabetic (Unifine Pentips Plus) 32 gauge x 5/32\" needle Use to administer insulin four times daily    Synthroid 112 mcg, oral, Daily before breakfast, NOEL    Toujeo SoloStar U-300 Insulin 300 unit/mL (1.5 mL) injection INJECT 40 UNITS SUBCUTANEOUSLY EVERY MORNING         Drug Interactions;  None at time of review    Assessment/Plan   Problem List Items Addressed This Visit    None  Visit Diagnoses         Type 2 diabetes mellitus without complication, without long-term current use of insulin        Relevant Orders    Referral to Clinical Pharmacy   Patient's DM2 is uncontrolled with blood sugars increasing into the 200-300s in the afternoons. She has also " been having multiple low blood sugars overnight down into 50-60s. She follows with Dr. Ward for her diabetes management and was referred for meal-time insulin titration. She had not started Humalog yet as she wanted to wait and is reluctant to start it again (was on in past). Options were discussed and patient wants to try changing timing of glipizide to see if that helps with blood sugars during the day. We also discussed lowering the Toujeo dose to 36 units to help reduce/prevent the low blood sugars overnight. Will not adjust Mounjaro or metformin. Will follow up with the patient in a few days to monitor blood sugar trend as we dicussed if blood sugars aren't improved with timing of glipizide will need to start Humalog. Patient was agreeable with plan.            Follow-up: 4/24/25 at 12 pm      Time spent with pt: Total length of time 40 (minutes) of the encounter and more than 50% was spent counseling the patient.    Babs Cheung, PharmD    Continue all meds under the continuation of care with the referring provider and clinical pharmacy team.

## 2025-04-21 NOTE — Clinical Note
Patient quite reluctant to restart Humalog, she wants to try adjust timing of glipizide to see if that helps blood sugars. We discussed following up on Thursday and if still elevated then we start Humalog. Given she is resistant to restarting Humalog and asked about alternatives - I was just curious. Has pioglitazone every been discussed?

## 2025-04-22 PROCEDURE — RXMED WILLOW AMBULATORY MEDICATION CHARGE

## 2025-04-24 ENCOUNTER — TELEMEDICINE (OUTPATIENT)
Dept: PHARMACY | Facility: HOSPITAL | Age: 65
End: 2025-04-24
Payer: COMMERCIAL

## 2025-04-24 DIAGNOSIS — E11.9 TYPE 2 DIABETES MELLITUS WITHOUT COMPLICATION, WITHOUT LONG-TERM CURRENT USE OF INSULIN: ICD-10-CM

## 2025-04-24 RX ORDER — PIOGLITAZONE 15 MG/1
15 TABLET ORAL DAILY
Qty: 30 TABLET | Refills: 2 | Status: SHIPPED | OUTPATIENT
Start: 2025-04-24

## 2025-04-24 NOTE — PROGRESS NOTES
Patient ID: Alina Chavarria is a 64 y.o. female who presents for Diabetes.    Referring Provider: Lizeth Ward MD  PCP: Meredith Darling MD       Subjective   Preferred pharmacy: [facility] except for Moun[facility]o to [facility]   Can patient afford prescribed medications: Yes, but does note that Mounjaro and Glipizide XL are expensive     Diabetes  She has type 2 diabetes mellitus. Her disease course has been worsening (Last HbA1c up to 8.3%). Current diabetic treatments: Toujeo 36 units daily, metformin 1g BID, Mounjaro 12.5 mg weekly, and glipizide XL 5 mg daily. She has not started Humalog recommended by endocrinology yet. (Patient currently using Freestyle kaden 2 to monitor blood sugars. Her blood sugars have been largely uncontrolled recently with multiple days in 300s after lunch. However, these have also been complicated by lows overnight into the 50-60s.)       Current diet:   Overall: eats light breakfast and two larger meals a day, but overall is eating less due to lower appetite with Mounjaro   Breakfast (7am): Toast + fruit  Lunch (11-12): sandwich, or salad, pretzels  Dinner (after 5 pm): protein, vegetable, salad     Current exercise: none - works full-time, occasionally walking dogs     Patient is using: continuous glucose monitor    Hypoglycemia frequency: Multiple in last week   Hypoglycemia awareness: Yes     Past pharmacotherapy:  Jardiance - stopped due to multiple yeast infections                             Objective     Primary/Secondary Prevention   - Statin? No  - ACE-I/ARB? Yes  - Aspirin? No    Pertinent PMH Review:  - PMH of Pancreatitis: No  - PMH of Retinopathy: No  - PMH of MTC: No    LMP  (LMP Unknown)    BP Readings from Last 4 Encounters:   02/19/25 162/78   09/25/24 173/84   12/12/23 167/84   10/07/23 150/73      There were no vitals filed for this visit.     Labs  Lab Results   Component Value Date    BILITOT 0.5 05/28/2024    CALCIUM 9.1 10/17/2024    CO2 27 10/17/2024    CL  "107 10/17/2024    CREATININE 0.60 10/17/2024    GLUCOSE 181 (H) 10/17/2024    ALKPHOS 79 05/28/2024    K 4.9 10/17/2024    PROT 6.3 (L) 05/28/2024     10/17/2024    AST 10 05/28/2024    ALT 12 05/28/2024    BUN 10 10/17/2024    ANIONGAP 14 10/17/2024    PHOS 3.7 10/17/2024    ALBUMIN 4.3 10/17/2024    AMYLASE 25 (L) 05/28/2024    LIPASE 18 05/28/2024    GFRF >90 09/19/2023     Lab Results   Component Value Date    TRIG 94 05/28/2024    CHOL 156 05/28/2024    LDLCALC 72 05/28/2024    HDL 65.2 05/28/2024     Lab Results   Component Value Date    HGBA1C 8.3 (A) 02/19/2025       Current Outpatient Medications   Medication Instructions    blood-glucose sensor (FreeStyle Ney 3 Plus Sensor) device Change sensor every 15 days    flash glucose sensor kit (FreeStyle Ney 2 Sensor) kit Change sensor EVERY 14 days    insulin lispro (HumaLOG KwikPen Insulin) 100 unit/mL pen Start humalog 4 units for breakfast, 4 units for lunch and 6 units before dinner plus sliding scale (MDD 44 units daily)    metFORMIN (GLUCOPHAGE) 1,000 mg, oral, 2 times daily (morning and late afternoon)    Mounjaro 12.5 mg, subcutaneous, Every 7 days    olmesartan (BENICAR) 40 mg, oral, Daily    pen needle, diabetic (Unifine Pentips Plus) 32 gauge x 5/32\" needle Use to administer insulin four times daily    pioglitazone (ACTOS) 15 mg, oral, Daily    Synthroid 112 mcg, oral, Daily before breakfast, NOEL    Toujeo SoloStar U-300 Insulin 300 unit/mL (1.5 mL) injection INJECT 40 UNITS SUBCUTANEOUSLY EVERY MORNING         Drug Interactions;  None at time of review    Assessment/Plan   Problem List Items Addressed This Visit    None  Visit Diagnoses         Type 2 diabetes mellitus without complication, without long-term current use of insulin        Relevant Orders    Referral to Clinical Pharmacy   Patient's DM2 is uncontrolled, and she follows with Dr. Ward for her diabetes management. She was referred for meal-time insulin titration. Her blood " sugars have been significantly elevated recently increasing into the 200-300s in the afternoons. She has also been having multiple low blood sugars overnight down into 50-60s. Adjusting the timing of glipizide did not help. Given her continued low blood sugars recommended she stop the glipizide XL. Did discuss trying pioglitazone 15 mg daily as option if trying to avoid Humalog insulin. Educated patient on MOA/possible side effects/administration. Will continue Toujeo 36 units daily, metformin, and Mounjaro. Will follow up with patient in a week to monitor blood sugar trends. Educated patient if she continues to have low blood sugars or blood sugars continue to increase to call sooner. Patient acknowledged.            Follow-up: 5/1/25 at 12 pm      Time spent with pt: Total length of time 20 (minutes) of the encounter and more than 50% was spent counseling the patient.    Babs Cheung, PharmD    Continue all meds under the continuation of care with the referring provider and clinical pharmacy team.

## 2025-04-28 ENCOUNTER — PHARMACY VISIT (OUTPATIENT)
Dept: PHARMACY | Facility: CLINIC | Age: 65
End: 2025-04-28
Payer: COMMERCIAL

## 2025-05-01 ENCOUNTER — APPOINTMENT (OUTPATIENT)
Dept: PHARMACY | Facility: HOSPITAL | Age: 65
End: 2025-05-01
Payer: COMMERCIAL

## 2025-05-01 DIAGNOSIS — Z79.4 TYPE 2 DIABETES MELLITUS WITHOUT COMPLICATION, WITH LONG-TERM CURRENT USE OF INSULIN: Primary | ICD-10-CM

## 2025-05-01 DIAGNOSIS — E11.9 TYPE 2 DIABETES MELLITUS WITHOUT COMPLICATION, WITHOUT LONG-TERM CURRENT USE OF INSULIN: ICD-10-CM

## 2025-05-01 DIAGNOSIS — E11.9 TYPE 2 DIABETES MELLITUS WITHOUT COMPLICATION, WITH LONG-TERM CURRENT USE OF INSULIN: Primary | ICD-10-CM

## 2025-05-01 NOTE — PROGRESS NOTES
Patient ID: Alina Chavarria is a 64 y.o. female who presents for Diabetes.    Referring Provider: Lizeth Ward MD  PCP: Meredith Darling MD       Subjective   Preferred pharmacy: [facility] except for M[facility]o to [facility]   Can patient afford prescribed medications: Yes, but does note that Mounjaro is expensive      Diabetes  She has type 2 diabetes mellitus. Her disease course has been worsening (Last HbA1c up to 8.3%). Current diabetic treatments: Toujeo 40 units daily (recently increased back up), metformin 1g BID, Mounjaro 12.5 mg weekly, and pioglitazone 15 mg daily. She has not started Humalog yet. (Patient currently using Freestyle kaden 2 to monitor blood sugars. Her blood sugars have been largely uncontrolled recently with multiple days in 300s after lunch. She has had some improvements since last visit as now not having lows overnight.)       Current diet:   Overall: eats light breakfast and two larger meals a day, but overall is eating less due to lower appetite with Mounjaro   Breakfast (7am): Toast + fruit  Lunch (11-12): sandwich, or salad, pretzels  Dinner (after 5 pm): protein, vegetable, salad     Current exercise: none - works full-time, occasionally walking dogs     Patient is using: continuous glucose monitor    Hypoglycemia frequency: Multiple in last week   Hypoglycemia awareness: Yes     Past pharmacotherapy:  Jardiance - stopped due to multiple yeast infections                           Objective     Primary/Secondary Prevention   - Statin? No  - ACE-I/ARB? Yes  - Aspirin? No    Pertinent PMH Review:  - PMH of Pancreatitis: No  - PMH of Retinopathy: No  - PMH of MTC: No    LMP  (LMP Unknown)    BP Readings from Last 4 Encounters:   02/19/25 162/78   09/25/24 173/84   12/12/23 167/84   10/07/23 150/73      There were no vitals filed for this visit.     Labs  Lab Results   Component Value Date    BILITOT 0.5 05/28/2024    CALCIUM 9.1 10/17/2024    CO2 27 10/17/2024     10/17/2024  "   CREATININE 0.60 10/17/2024    GLUCOSE 181 (H) 10/17/2024    ALKPHOS 79 05/28/2024    K 4.9 10/17/2024    PROT 6.3 (L) 05/28/2024     10/17/2024    AST 10 05/28/2024    ALT 12 05/28/2024    BUN 10 10/17/2024    ANIONGAP 14 10/17/2024    PHOS 3.7 10/17/2024    ALBUMIN 4.3 10/17/2024    AMYLASE 25 (L) 05/28/2024    LIPASE 18 05/28/2024    GFRF >90 09/19/2023     Lab Results   Component Value Date    TRIG 94 05/28/2024    CHOL 156 05/28/2024    LDLCALC 72 05/28/2024    HDL 65.2 05/28/2024     Lab Results   Component Value Date    HGBA1C 8.3 (A) 02/19/2025       Current Outpatient Medications   Medication Instructions    blood-glucose sensor (FreeStyle Ney 3 Plus Sensor) device Change sensor every 15 days    flash glucose sensor kit (FreeStyle Ney 2 Sensor) kit Change sensor EVERY 14 days    insulin lispro (HumaLOG KwikPen Insulin) 100 unit/mL pen Start humalog 4 units for breakfast, 4 units for lunch and 6 units before dinner plus sliding scale (MDD 44 units daily)    metFORMIN (GLUCOPHAGE) 1,000 mg, oral, 2 times daily (morning and late afternoon)    Mounjaro 12.5 mg, subcutaneous, Every 7 days    olmesartan (BENICAR) 40 mg, oral, Daily    pen needle, diabetic (Unifine Pentips Plus) 32 gauge x 5/32\" needle Use to administer insulin four times daily    pioglitazone (ACTOS) 15 mg, oral, Daily    Synthroid 112 mcg, oral, Daily before breakfast, NOEL    Toujeo SoloStar U-300 Insulin 300 unit/mL (1.5 mL) injection INJECT 40 UNITS SUBCUTANEOUSLY EVERY MORNING         Drug Interactions;  None at time of review    Assessment/Plan   Problem List Items Addressed This Visit    None  Visit Diagnoses         Type 2 diabetes mellitus without complication, without long-term current use of insulin        Relevant Orders    Referral to Clinical Pharmacy   Patient's DM2 is uncontrolled, and she follows with Dr. Ward for her diabetes management. She was referred for meal-time insulin titration. Her blood sugars have been " significantly elevated recently increasing into the 200-300s during the day. Since last visit her hypoglycemia overnight has improved. She did stop glipizide XL and start pioglitazone 15 mg daily. She has gone back up to Toujeo 40 units daily. Given she is still experiencing blood sugars in 200-300s, we discussed adding Humalog sliding scale 2 units for every 50 >150 with meals with plans to titrate Humalog. Patient agreeable. Will continue Toujeo 40 units daily, metformin, Mounjaro, and pioglitazone 15 mg daily. Will schedule close follow up next week to assess blood sugar trends.            Follow-up: 5/8/25 at 12 pm      Time spent with pt: Total length of time 20 (minutes) of the encounter and more than 50% was spent counseling the patient.    Babs Cheung, PharmD    Continue all meds under the continuation of care with the referring provider and clinical pharmacy team.

## 2025-05-03 LAB
ALBUMIN SERPL-MCNC: 4.3 G/DL (ref 3.6–5.1)
ALP SERPL-CCNC: 78 U/L (ref 37–153)
ALT SERPL-CCNC: 10 U/L (ref 6–29)
ANION GAP SERPL CALCULATED.4IONS-SCNC: 11 MMOL/L (CALC) (ref 7–17)
AST SERPL-CCNC: 9 U/L (ref 10–35)
BILIRUB SERPL-MCNC: 0.5 MG/DL (ref 0.2–1.2)
BUN SERPL-MCNC: 13 MG/DL (ref 7–25)
C PEPTIDE SERPL-MCNC: 0.8 NG/ML (ref 0.8–3.85)
CALCIUM SERPL-MCNC: 9.3 MG/DL (ref 8.6–10.4)
CHLORIDE SERPL-SCNC: 104 MMOL/L (ref 98–110)
CHOLEST SERPL-MCNC: 162 MG/DL
CHOLEST/HDLC SERPL: 2.1 (CALC)
CO2 SERPL-SCNC: 27 MMOL/L (ref 20–32)
CREAT SERPL-MCNC: 0.43 MG/DL (ref 0.5–1.05)
EGFRCR SERPLBLD CKD-EPI 2021: 109 ML/MIN/1.73M2
EST. AVERAGE GLUCOSE BLD GHB EST-MCNC: 220 MG/DL
EST. AVERAGE GLUCOSE BLD GHB EST-SCNC: 12.2 MMOL/L
GLUCOSE P FAST SERPL-MCNC: 123 MG/DL (ref 65–99)
GLUCOSE SERPL-MCNC: 128 MG/DL (ref 65–99)
HBA1C MFR BLD: 9.3 %
HDLC SERPL-MCNC: 77 MG/DL
LDLC SERPL CALC-MCNC: 70 MG/DL (CALC)
NONHDLC SERPL-MCNC: 85 MG/DL (CALC)
POTASSIUM SERPL-SCNC: 5.2 MMOL/L (ref 3.5–5.3)
PROT SERPL-MCNC: 6.3 G/DL (ref 6.1–8.1)
SODIUM SERPL-SCNC: 142 MMOL/L (ref 135–146)
T4 FREE SERPL-MCNC: 1.6 NG/DL (ref 0.8–1.8)
TRIGL SERPL-MCNC: 73 MG/DL
TSH SERPL-ACNC: 1.09 MIU/L (ref 0.4–4.5)

## 2025-05-08 ENCOUNTER — APPOINTMENT (OUTPATIENT)
Dept: PHARMACY | Facility: HOSPITAL | Age: 65
End: 2025-05-08
Payer: COMMERCIAL

## 2025-05-08 DIAGNOSIS — E11.9 TYPE 2 DIABETES MELLITUS WITHOUT COMPLICATION, WITHOUT LONG-TERM CURRENT USE OF INSULIN: ICD-10-CM

## 2025-05-08 NOTE — PROGRESS NOTES
Patient ID: Alina Chavarria is a 64 y.o. female who presents for Diabetes.    Referring Provider: Lizeth Ward MD  PCP: Meredith Darling MD       Subjective   Preferred pharmacy: [facility] except for [facility] to [facility]   Can patient afford prescribed medications: Yes, but does note that Mounjaro is expensive      Diabetes  She has type 2 diabetes mellitus. Her disease course has been worsening (Last HbA1c up to 9.3%). Current diabetic treatments: Toujeo 40 units daily, metformin 1g BID, Mounjaro 12.5 mg weekly, and Humalog sliding scale (just started Monday or Tuesday per patient). She stopped pioglitazone on Tuesday as does not see benefit of another medication given using Humalog. (Patient currently using Freestyle kaden 2 to monitor blood sugars. Her blood sugars have been largely uncontrolled recently with multiple days in 300s after lunch. She has had some improvements since last visit as now not having lows overnight.)       Current diet:   Overall: eats light breakfast and two larger meals a day, but overall is eating less due to lower appetite with Mounjaro   Breakfast (7am): Toast + fruit  Lunch (11-12): sandwich, or salad, pretzels  Dinner (after 5 pm): protein, vegetable, salad     Current exercise: none - works full-time, occasionally walking dogs     Patient is using: continuous glucose monitor    Hypoglycemia frequency: Multiple in last week   Hypoglycemia awareness: Yes     Past pharmacotherapy:  Jardiance - stopped due to multiple yeast infections                       Objective     Primary/Secondary Prevention   - Statin? No  - ACE-I/ARB? Yes  - Aspirin? No    Pertinent PMH Review:  - PMH of Pancreatitis: No  - PMH of Retinopathy: No  - PMH of MTC: No    LMP  (LMP Unknown)    BP Readings from Last 4 Encounters:   02/19/25 162/78   09/25/24 173/84   12/12/23 167/84   10/07/23 150/73      There were no vitals filed for this visit.     Labs  Lab Results   Component Value Date    BILITOT 0.5  "05/02/2025    CALCIUM 9.3 05/02/2025    CO2 27 05/02/2025     05/02/2025    CREATININE 0.43 (L) 05/02/2025    GLUCOSE 128 (H) 05/02/2025    ALKPHOS 78 05/02/2025    K 5.2 05/02/2025    PROT 6.3 05/02/2025     05/02/2025    AST 9 (L) 05/02/2025    ALT 10 05/02/2025    BUN 13 05/02/2025    ANIONGAP 11 05/02/2025    PHOS 3.7 10/17/2024    ALBUMIN 4.3 05/02/2025    AMYLASE 25 (L) 05/28/2024    LIPASE 18 05/28/2024    GFRF >90 09/19/2023     Lab Results   Component Value Date    TRIG 73 05/02/2025    CHOL 162 05/02/2025    LDLCALC 70 05/02/2025    HDL 77 05/02/2025     Lab Results   Component Value Date    HGBA1C 9.3 (H) 05/02/2025       Current Outpatient Medications   Medication Instructions    blood-glucose sensor (FreeStyle Ney 3 Plus Sensor) device Change sensor every 15 days    flash glucose sensor kit (FreeStyle Ney 2 Sensor) kit Change sensor EVERY 14 days    insulin lispro (HumaLOG KwikPen Insulin) 100 unit/mL pen Start humalog 4 units for breakfast, 4 units for lunch and 6 units before dinner plus sliding scale (MDD 44 units daily)    metFORMIN (GLUCOPHAGE) 1,000 mg, oral, 2 times daily (morning and late afternoon)    Mounjaro 12.5 mg, subcutaneous, Every 7 days    olmesartan (BENICAR) 40 mg, oral, Daily    pen needle, diabetic (Unifine Pentips Plus) 32 gauge x 5/32\" needle Use to administer insulin four times daily    pioglitazone (ACTOS) 15 mg, oral, Daily    Synthroid 112 mcg, oral, Daily before breakfast, NOEL    Toujeo SoloStar U-300 Insulin 300 unit/mL (1.5 mL) injection INJECT 40 UNITS SUBCUTANEOUSLY EVERY MORNING         Drug Interactions;  None at time of review    Assessment/Plan   Problem List Items Addressed This Visit    None  Visit Diagnoses         Type 2 diabetes mellitus without complication, without long-term current use of insulin        Relevant Orders    Referral to Clinical Pharmacy   Patient's DM2 is uncontrolled, and she follows with Dr. Ward for her diabetes management. " She was referred for meal-time insulin titration. Her blood sugars have been significantly elevated recently increasing into the 200-300s during the day. She is is having some hypoglycemia overnight. She has stopped the pioglitazone Tuesday as she does not feel benefit>risk for continuing as she is resuming the Humalog. She also just started the Humalog sliding scale again. We discussed possibly going up on dose of Mounjaro, but at this time decided to hold off at this time. Will lower the dose of Toujeo to 38 units daily as she continued to run low overnight. We discussed meal-time insulin/sliding scale and based on blood sugar trends will start by scheduling 2 units with lunch along with sliding scale with all meals (patient not comfortable starting large number of units with meals especially breakfast. Do anticipate meal-time insulin will need to be increase in future. Will assess blood sugar trends in two weeks. Patient agreeable with plan.  1. REDUCE Toujeo to 38 units once daily   2. ADJUST Humalog to sliding scale with breakfast and dinner, take 2 units plus sliding scale with lunch   3. CONTINUE metformin 1 g BID and Mounjaro 12.5 mg daily           Follow-up: 5/22/25 at 12 pm      Time spent with pt: Total length of time 20 (minutes) of the encounter and more than 50% was spent counseling the patient.    Babs Cheung, PharmD    Continue all meds under the continuation of care with the referring provider and clinical pharmacy team.

## 2025-05-19 PROCEDURE — RXMED WILLOW AMBULATORY MEDICATION CHARGE

## 2025-05-22 ENCOUNTER — APPOINTMENT (OUTPATIENT)
Dept: PHARMACY | Facility: HOSPITAL | Age: 65
End: 2025-05-22
Payer: COMMERCIAL

## 2025-05-22 DIAGNOSIS — E11.9 TYPE 2 DIABETES MELLITUS WITHOUT COMPLICATION, WITHOUT LONG-TERM CURRENT USE OF INSULIN: ICD-10-CM

## 2025-05-22 NOTE — PROGRESS NOTES
Patient ID: Alina Chavarria is a 64 y.o. female who presents for Diabetes.    Referring Provider: Lizeth Ward MD  PCP: Meredith Darling MD       Subjective   Preferred pharmacy: [facility] except for [facility] to [facility]   Can patient afford prescribed medications: Yes, but does note that Mounjaro is expensive      Diabetes  She has type 2 diabetes mellitus. Her disease course has been worsening (Last HbA1c up to 9.3%). Current diabetic treatments: Toujeo 35 units daily (just reduced two days ago from 38 units), metformin 1g BID, Mounjaro 12.5 mg weekly, and Humalog sliding scale. (Patient currently using Freestyle kaden 2 to monitor blood sugars. Her blood sugars have been largely uncontrolled recently with multiple days in 300s after lunch. She has had some improvements since last visit but still having lows overnight.)       Current diet:   Overall: eats light breakfast and two larger meals a day, but overall is eating less due to lower appetite with Mounjaro   Breakfast (7am): Toast + fruit  Lunch (11-12): sandwich, or salad, pretzels  Dinner (after 5 pm): protein, vegetable, salad     Current exercise: none - works full-time, occasionally walking dogs     Patient is using: continuous glucose monitor    Hypoglycemia frequency: Multiple in last week   Hypoglycemia awareness: Yes     Past pharmacotherapy:  Jardiance - stopped due to multiple yeast infections                         Objective     Primary/Secondary Prevention   - Statin? No  - ACE-I/ARB? Yes  - Aspirin? No    Pertinent PMH Review:  - PMH of Pancreatitis: No  - PMH of Retinopathy: No  - PMH of MTC: No    LMP  (LMP Unknown)    BP Readings from Last 4 Encounters:   02/19/25 162/78   09/25/24 173/84   12/12/23 167/84   10/07/23 150/73      There were no vitals filed for this visit.     Labs  Lab Results   Component Value Date    BILITOT 0.5 05/02/2025    CALCIUM 9.3 05/02/2025    CO2 27 05/02/2025     05/02/2025    CREATININE 0.43 (L)  "05/02/2025    GLUCOSE 128 (H) 05/02/2025    ALKPHOS 78 05/02/2025    K 5.2 05/02/2025    PROT 6.3 05/02/2025     05/02/2025    AST 9 (L) 05/02/2025    ALT 10 05/02/2025    BUN 13 05/02/2025    ANIONGAP 11 05/02/2025    PHOS 3.7 10/17/2024    ALBUMIN 4.3 05/02/2025    AMYLASE 25 (L) 05/28/2024    LIPASE 18 05/28/2024    GFRF >90 09/19/2023     Lab Results   Component Value Date    TRIG 73 05/02/2025    CHOL 162 05/02/2025    LDLCALC 70 05/02/2025    HDL 77 05/02/2025     Lab Results   Component Value Date    HGBA1C 9.3 (H) 05/02/2025       Current Outpatient Medications   Medication Instructions    blood-glucose sensor (FreeStyle Ney 3 Plus Sensor) device Change sensor every 15 days    flash glucose sensor kit (FreeStyle Ney 2 Sensor) kit Change sensor EVERY 14 days    insulin lispro (HumaLOG KwikPen Insulin) 100 unit/mL pen Start humalog 4 units for breakfast, 4 units for lunch and 6 units before dinner plus sliding scale (MDD 44 units daily)    metFORMIN (GLUCOPHAGE) 1,000 mg, oral, 2 times daily (morning and late afternoon)    Mounjaro 12.5 mg, subcutaneous, Every 7 days    olmesartan (BENICAR) 40 mg, oral, Daily    pen needle, diabetic (Unifine Pentips Plus) 32 gauge x 5/32\" needle Use to administer insulin four times daily    pioglitazone (ACTOS) 15 mg, oral, Daily    Synthroid 112 mcg, oral, Daily before breakfast, NOEL    Toujeo SoloStar U-300 Insulin 300 unit/mL (1.5 mL) injection INJECT 40 UNITS SUBCUTANEOUSLY EVERY MORNING         Drug Interactions;  None at time of review    Assessment/Plan   Problem List Items Addressed This Visit    None  Visit Diagnoses         Type 2 diabetes mellitus without complication, without long-term current use of insulin        Relevant Orders    Referral to Clinical Pharmacy   Patient's DM2 is uncontrolled, and she follows with Dr. Ward for her diabetes management. She was referred for meal-time insulin titration. Her blood sugars have been significantly elevated " recently increasing into the 200-300s during the day. She is still having some hypoglycemia overnight despite previous Toujeo reduction. She reduced Toujeo to 35 units two days ago and has not had a low overnight since. Will plan to keep Toujeo at 35 units daily. She did not make recommended additional units to Humalog at lunch as discussed last visit. Based on the continued trend in blood sugars we again discussed slowly added set units of Humalog before meals. Will start with 1 units plus sliding scale with breakfast and 2 units plus sliding scale at lunch. Will keep just sliding scale with dinner for now (patient concerned for lows). However, did discuss snacking after dinner (typically she is eating strawberries, grapes, or half a bagel). Recommended she add some protein to her snacks to help balance the carbs. Will keep metformin and Mounjaro the same. Will assess blood sugar trends in two weeks. Do anticipate meal-time insulin will need to be increased in future. Patient agreeable with plan.  1. CONTINUE Toujeo to 35 units once daily   2. ADJUST Humalog to 1 unit plus sliding scale with breakfast, 2 units plus sliding scale with lunch, and just sliding scale dinner  3. CONTINUE metformin 1 g BID and Mounjaro 12.5 mg daily           Follow-up: 6/5/25 at 12 pm      Time spent with pt: Total length of time 20 (minutes) of the encounter and more than 50% was spent counseling the patient.    Babs Cheung, PharmD    Continue all meds under the continuation of care with the referring provider and clinical pharmacy team.

## 2025-05-23 ENCOUNTER — PHARMACY VISIT (OUTPATIENT)
Dept: PHARMACY | Facility: CLINIC | Age: 65
End: 2025-05-23
Payer: COMMERCIAL

## 2025-05-26 DIAGNOSIS — E11.9 TYPE 2 DIABETES MELLITUS WITHOUT COMPLICATION, WITH LONG-TERM CURRENT USE OF INSULIN: ICD-10-CM

## 2025-05-26 DIAGNOSIS — Z79.4 TYPE 2 DIABETES MELLITUS WITHOUT COMPLICATION, WITH LONG-TERM CURRENT USE OF INSULIN: ICD-10-CM

## 2025-05-26 DIAGNOSIS — E03.9 HYPOTHYROIDISM, UNSPECIFIED TYPE: ICD-10-CM

## 2025-05-27 RX ORDER — LEVOTHYROXINE SODIUM 112 UG/1
112 TABLET ORAL
Qty: 90 TABLET | Refills: 0 | Status: SHIPPED | OUTPATIENT
Start: 2025-05-27

## 2025-05-27 RX ORDER — METFORMIN HYDROCHLORIDE 1000 MG/1
TABLET ORAL
Qty: 180 TABLET | Refills: 1 | Status: SHIPPED | OUTPATIENT
Start: 2025-05-27

## 2025-06-05 ENCOUNTER — APPOINTMENT (OUTPATIENT)
Dept: PHARMACY | Facility: HOSPITAL | Age: 65
End: 2025-06-05
Payer: COMMERCIAL

## 2025-06-05 DIAGNOSIS — E11.9 TYPE 2 DIABETES MELLITUS WITHOUT COMPLICATION, WITHOUT LONG-TERM CURRENT USE OF INSULIN: ICD-10-CM

## 2025-06-05 NOTE — PROGRESS NOTES
Patient ID: Alina Chavarria is a 64 y.o. female who presents for Diabetes.    Referring Provider: Lizeth Ward MD  PCP: Meredith Darling MD       Subjective   Preferred pharmacy: [facility] except for [facility]o to [facility]   Can patient afford prescribed medications: Yes, but does note that Mounjaro is expensive      Diabetes  She has type 2 diabetes mellitus. Her disease course has been worsening (Last HbA1c up to 9.3%). Current diabetic treatments: Toujeo 30 units daily (just reduced two days ago from 35 units), metformin 1g BID, Mounjaro 12.5 mg weekly, and Humalog 1 unit plus sliding scale breakfast, 2 units plus scale with lunch, and just sliding scale with dinner (mostly just sliding scale) (Patient currently using Freestyle kaden 2 to monitor blood sugars. Her blood sugars have been largely uncontrolled. She has continued to have overnight lows as well as high blood sugars in 200-300s throughout the day. However she is less 300s than before.)       Current diet:   Overall: eats light breakfast and two larger meals a day, but overall is eating less due to lower appetite with Mounjaro   Breakfast (7am): Toast + fruit  Lunch (11-12): sandwich, or salad, pretzels  Dinner (after 5 pm): protein, vegetable, salad     Current exercise: none - works full-time, occasionally walking dogs     Patient is using: continuous glucose monitor    Hypoglycemia frequency: Multiple in last week   Hypoglycemia awareness: Yes     Past pharmacotherapy:  Jardiance - stopped due to multiple yeast infections                               Objective     Primary/Secondary Prevention   - Statin? No  - ACE-I/ARB? Yes  - Aspirin? No    Pertinent PMH Review:  - PMH of Pancreatitis: No  - PMH of Retinopathy: No  - PMH of MTC: No    LMP  (LMP Unknown)    BP Readings from Last 4 Encounters:   02/19/25 162/78   09/25/24 173/84   12/12/23 167/84   10/07/23 150/73      There were no vitals filed for this visit.     Labs  Lab Results  "  Component Value Date    BILITOT 0.5 05/02/2025    CALCIUM 9.3 05/02/2025    CO2 27 05/02/2025     05/02/2025    CREATININE 0.43 (L) 05/02/2025    GLUCOSE 128 (H) 05/02/2025    ALKPHOS 78 05/02/2025    K 5.2 05/02/2025    PROT 6.3 05/02/2025     05/02/2025    AST 9 (L) 05/02/2025    ALT 10 05/02/2025    BUN 13 05/02/2025    ANIONGAP 11 05/02/2025    PHOS 3.7 10/17/2024    ALBUMIN 4.3 05/02/2025    AMYLASE 25 (L) 05/28/2024    LIPASE 18 05/28/2024    GFRF >90 09/19/2023     Lab Results   Component Value Date    TRIG 73 05/02/2025    CHOL 162 05/02/2025    LDLCALC 70 05/02/2025    HDL 77 05/02/2025     Lab Results   Component Value Date    HGBA1C 9.3 (H) 05/02/2025       Current Outpatient Medications   Medication Instructions    blood-glucose sensor (FreeStyle Ney 3 Plus Sensor) device Change sensor every 15 days    flash glucose sensor kit (FreeStyle Ney 2 Sensor) kit Change sensor EVERY 14 days    insulin lispro (HumaLOG KwikPen Insulin) 100 unit/mL pen Start humalog 4 units for breakfast, 4 units for lunch and 6 units before dinner plus sliding scale (MDD 44 units daily)    metFORMIN (Glucophage) 1,000 mg tablet TAKE 1 TABLET BY MOUTH TWICE DAILY EVERY MORNING and late afternoon    Mounjaro 12.5 mg, subcutaneous, Every 7 days    olmesartan (BENICAR) 40 mg, oral, Daily    pen needle, diabetic (Unifine Pentips Plus) 32 gauge x 5/32\" needle Use to administer insulin four times daily    pioglitazone (ACTOS) 15 mg, oral, Daily    Synthroid 112 mcg, oral, Daily before breakfast    Toujeo SoloStar U-300 Insulin 300 unit/mL (1.5 mL) injection INJECT 40 UNITS SUBCUTANEOUSLY EVERY MORNING         Drug Interactions;  None at time of review    Assessment/Plan   Problem List Items Addressed This Visit    None  Visit Diagnoses         Type 2 diabetes mellitus without complication, without long-term current use of insulin        Relevant Orders    Referral to Clinical Pharmacy   Patient's DM2 is uncontrolled, " and she follows with Dr. Ward for her diabetes management. She was referred for meal-time insulin titration. Her blood sugars have been significantly elevated recently increasing into the 200-300s during the day. She is still having some hypoglycemia overnight despite previous Toujeo reduction. She reduced Toujeo to 30 units two days ago and has not had a low overnight since. Will plan to keep Toujeo at 30 units daily. She has not been taking the recommended additional units to breakfast and lunch that was discussed last visit often over fear for lows. We discussed short acting insulins help with meal-time coverage preventing blood sugars from spiking high after a meal. Discussed importance of taking before meal. Again recommended taking Humalog 1 unit plus sliding scale with breakfast and 2 units with lunch plus sliding scale. Will continue just sliding scale with dinner for now (patient concerned for lows). Will keep metformin and Mounjaro the same. Do anticipate meal-time insulin will need to be increased in future. Patient agreeable with plan.  1. CONTINUE Toujeo 30 units once daily   2. CONTINUE Humalog 1 unit plus sliding scale with breakfast, 2 units plus sliding scale with lunch, and just sliding scale dinner  3. CONTINUE metformin 1 g BID and Mounjaro 12.5 mg daily           Follow-up: with pharmacy week of July 7-11th      Time spent with pt: Total length of time 25 (minutes) of the encounter and more than 50% was spent counseling the patient.    Babs Cheung, PharmD    Continue all meds under the continuation of care with the referring provider and clinical pharmacy team.

## 2025-06-10 ENCOUNTER — OFFICE VISIT (OUTPATIENT)
Dept: URGENT CARE | Age: 65
End: 2025-06-10
Payer: COMMERCIAL

## 2025-06-10 VITALS
SYSTOLIC BLOOD PRESSURE: 162 MMHG | TEMPERATURE: 97.8 F | RESPIRATION RATE: 15 BRPM | OXYGEN SATURATION: 98 % | HEART RATE: 62 BPM | DIASTOLIC BLOOD PRESSURE: 90 MMHG

## 2025-06-10 DIAGNOSIS — J02.9 SORE THROAT: Primary | ICD-10-CM

## 2025-06-10 LAB
POC HUMAN RHINOVIRUS PCR: NEGATIVE
POC INFLUENZA A VIRUS PCR: NEGATIVE
POC INFLUENZA B VIRUS PCR: NEGATIVE
POC RESPIRATORY SYNCYTIAL VIRUS PCR: NEGATIVE
POC STREPTOCOCCUS PYOGENES (GROUP A STREP) PCR: NEGATIVE

## 2025-06-10 NOTE — PROGRESS NOTES
Subjective   Patient ID: Alina Chavarria is a 64 y.o. female. They present today with a chief complaint of Sore Throat (Strep rule out. 3 days).    Patient disposition: Home    History of Present Illness  HPI  Sore throat for the past 3 days.  No congestion or cough.  Mild headache from coughing and fatigue.  Patient works in a school.  No history of asthma or bronchitis.  No medications taken for the symptoms.  Throat symptoms have not improved.  No seasonal allergies.  No other complaints or symptoms.      Past Medical History  Allergies as of 06/10/2025    (No Known Allergies)       Prescriptions Prior to Admission[1]     Current Medications[2]    Problem List[3]    Surgical History[4]         Review of Systems  As noted in HPI. ROS otherwise negative unless noted.       Objective    Vitals:    06/10/25 1135   BP: 162/90   Pulse: 62   Resp: 15   Temp: 36.6 °C (97.8 °F)   SpO2: 98%     No LMP recorded (lmp unknown). Patient is postmenopausal.    Physical Exam  Constitutional: vital signs reviewed. Well developed, well nourished. patient alert and patient without distress.   Head and Face: Normal and atraumatic.    Ears, Nose, Mouth, and Throat:   Hearing: Normal.  External inspection of nose: Normal.   Lips, teeth, tongue and gums: Normal and well hydrated. External inspection of ears: Normal. Ear canals and TMs: Normal.  Posterior pharynx moist, no exudate, symmetric, no abscess, and with post nasal drip.   Lymphatic: No cervical lymphadenopathy  Neck: No neck mass was observed. Supple. normal muscle tone.   Cardiovascular: Heart rate normal, normal S1 and S2, no gallops, no murmurs and no pericardial rub. Rhythm: Normal.  Pulmonary: No respiratory distress. Palpation of chest: Normal. Clear bilateral breath sounds.   Psych: Normal mood and affect        Procedures    Point of Care Test & Imaging Results from this visit  Results for orders placed or performed in visit on 06/10/25   POCT SPOTFIRE R/ST Panel Mini  w/Strep A ([employer]) manually resulted    Collection Time: 06/10/25 12:03 PM   Result Value Ref Range    POC Group A Strep, PCR Negative Negative    POC Respiratory Syncytial Virus PCR Negative Negative    POC Influenza A Virus PCR Negative Negative    POC Influenza B Virus PCR Negative Negative    POC Human Rhinovirus PCR Negative Negative            Diagnostic study results (if any) were reviewed.  (If applicable) preliminary radiology reading: None    Assessment/Plan   Allergies, medications, history, and pertinent labs/EKGs/Imaging reviewed.        Medical Decision Making  See note    Orders and Diagnoses  Diagnoses and all orders for this visit:  Sore throat  -     POCT SPOTFIRE R/ST Panel Mini w/Strep A ([employer]) manually resulted      Medical Admin Record      Follow Up Instructions  No follow-ups on file.    At time of discharge patient was clinically well-appearing and HDS for outpatient management. The patient and/or family was educated regarding diagnosis, supportive care, OTC and Rx medications. The patient and/or family was given the opportunity to ask questions prior to discharge and all questions answered. They verbalized understanding of my discussion of the plans for treatment, expected course, indications to return to  or seek further evaluation in ED, and the need for timely follow up as directed.      Electronically signed by [facility]           [1] (Not in a hospital admission)  [2]   Current Outpatient Medications   Medication Sig Dispense Refill    blood-glucose sensor (FreeStyle Ney 3 Plus Sensor) device Change sensor every 15 days 9 each 1    flash glucose sensor kit (FreeStyle Ney 2 Sensor) kit Change sensor EVERY 14 days 6 each 3    insulin lispro (HumaLOG KwikPen Insulin) 100 unit/mL pen Start humalog 4 units for breakfast, 4 units for lunch and 6 units before dinner plus sliding scale (MDD 44 units daily) 45 mL 0    metFORMIN (Glucophage) 1,000 mg tablet TAKE 1 TABLET  "BY MOUTH TWICE DAILY EVERY MORNING and late afternoon 180 tablet 1    olmesartan (BENIcar) 40 mg tablet Take 1 tablet (40 mg) by mouth once daily.      pen needle, diabetic (Unifine Pentips Plus) 32 gauge x 5/32\" needle Use to administer insulin four times daily 400 each 1    pioglitazone (Actos) 15 mg tablet Take 1 tablet (15 mg) by mouth once daily. 30 tablet 2    Synthroid 112 mcg tablet TAKE 1 TABLET BY MOUTH EVERY MORNING 90 tablet 0    tirzepatide (Mounjaro) 12.5 mg/0.5 mL pen injector Inject 12.5 mg under the skin every 7 days. 6 mL 1    Toujeo SoloStar U-300 Insulin 300 unit/mL (1.5 mL) injection INJECT 40 UNITS SUBCUTANEOUSLY EVERY MORNING 36 mL 0     No current facility-administered medications for this visit.   [3]   Patient Active Problem List  Diagnosis    Benign essential hypertension    Dyslipidemia    Hypothyroidism   [4] No past surgical history on file.    "

## 2025-06-10 NOTE — PATIENT INSTRUCTIONS
A rapid PCR test was performed today including tests for Influenza A, influenza B, RSV, rhinovirus (common cold virus), strep throat, Covid-19.  The results were: Negative.    You have an upper respiratory infection. Mostly, these are caused by viruses and treatment is as follows. Symptoms may last for up to 1-2 weeks, but follow up with PCP if your symptoms start worsening.  For muscle aches, fever, chills: Acetaminophen or Ibuprofen, Advil, or Aleve can be used.  Hydration: Maintain adequate hydration with water.  Nasal symptoms: Nasal Saline available over-the-counter, can be used 3-4 times per day  Decongestants reduce nasal congestion and discharge  Vaseline at opening of nares may reduce irritation   Cool Mist Humidifier may loosens discharge  Cough Suppressants or expectorants may be taken over-the-counter     Antibiotics are not indicated for treatment, as antibiotics do not treat viruses.      Start taking a nasal steroid which may include: Flonase, Nasacort, or Nasonex and use as directed. These medications are now available over the counter.

## 2025-06-18 ENCOUNTER — APPOINTMENT (OUTPATIENT)
Dept: ENDOCRINOLOGY | Facility: CLINIC | Age: 65
End: 2025-06-18
Payer: COMMERCIAL

## 2025-06-18 VITALS
SYSTOLIC BLOOD PRESSURE: 148 MMHG | BODY MASS INDEX: 31.99 KG/M2 | WEIGHT: 192 LBS | DIASTOLIC BLOOD PRESSURE: 86 MMHG | HEIGHT: 65 IN

## 2025-06-18 DIAGNOSIS — Z97.8 USES SELF-APPLIED CONTINUOUS GLUCOSE MONITORING DEVICE: ICD-10-CM

## 2025-06-18 DIAGNOSIS — E03.9 HYPOTHYROIDISM, UNSPECIFIED TYPE: ICD-10-CM

## 2025-06-18 DIAGNOSIS — E11.9 TYPE 2 DIABETES MELLITUS WITHOUT COMPLICATION, WITH LONG-TERM CURRENT USE OF INSULIN: Primary | ICD-10-CM

## 2025-06-18 DIAGNOSIS — Z79.4 TYPE 2 DIABETES MELLITUS WITHOUT COMPLICATION, WITH LONG-TERM CURRENT USE OF INSULIN: Primary | ICD-10-CM

## 2025-06-18 PROCEDURE — 3077F SYST BP >= 140 MM HG: CPT | Performed by: HOSPITALIST

## 2025-06-18 PROCEDURE — 3052F HG A1C>EQUAL 8.0%<EQUAL 9.0%: CPT | Performed by: HOSPITALIST

## 2025-06-18 PROCEDURE — 3079F DIAST BP 80-89 MM HG: CPT | Performed by: HOSPITALIST

## 2025-06-18 PROCEDURE — 3008F BODY MASS INDEX DOCD: CPT | Performed by: HOSPITALIST

## 2025-06-18 PROCEDURE — 99214 OFFICE O/P EST MOD 30 MIN: CPT | Performed by: HOSPITALIST

## 2025-06-18 PROCEDURE — 95251 CONT GLUC MNTR ANALYSIS I&R: CPT | Performed by: HOSPITALIST

## 2025-06-18 PROCEDURE — RXMED WILLOW AMBULATORY MEDICATION CHARGE

## 2025-06-18 RX ORDER — TIRZEPATIDE 15 MG/.5ML
15 INJECTION, SOLUTION SUBCUTANEOUS
Qty: 6 ML | Refills: 2 | Status: SHIPPED | OUTPATIENT
Start: 2025-06-18

## 2025-06-18 RX ORDER — TIRZEPATIDE 15 MG/.5ML
15 INJECTION, SOLUTION SUBCUTANEOUS
Qty: 6 ML | Refills: 2 | Status: SHIPPED | OUTPATIENT
Start: 2025-06-18 | End: 2025-06-18 | Stop reason: SDUPTHER

## 2025-06-18 ASSESSMENT — ENCOUNTER SYMPTOMS
FREQUENCY: 0
DYSURIA: 0
SORE THROAT: 0
LIGHT-HEADEDNESS: 0
TROUBLE SWALLOWING: 0
PALPITATIONS: 0
NAUSEA: 0
VOMITING: 0
NERVOUS/ANXIOUS: 0
AGITATION: 0
VOICE CHANGE: 0
CONSTIPATION: 0
SLEEP DISTURBANCE: 0
EYE ITCHING: 0
ARTHRALGIAS: 0
CONSTITUTIONAL NEGATIVE: 1
SHORTNESS OF BREATH: 0
CHEST TIGHTNESS: 0
PHOTOPHOBIA: 0
TREMORS: 0
ABDOMINAL PAIN: 0
ABDOMINAL DISTENTION: 0
HEADACHES: 0
DIARRHEA: 0

## 2025-06-18 NOTE — PROGRESS NOTES
Subjective   Patient ID:  ((Dx dm: GDM; 1998/PCP: Dr. Darling in [place] /Podiatry: does not see one /Eye exam: twice yearly /Patient using freestyle kaden /Patient testing glucose 4 times daily. due to fluctuating blood glucose, hypoglycemia and/4 insulin injections daily. Patient is injecting meal time insulin 3 times daily based on glucose reading and sliding scale Compliant with diabetic regime. Pt is adherent and benefiting from CGM treatment plan / Hypothyroidism NOEL Synthroid 112 mcg every day; /Takes correctly).).   HPI   Lab Results   Component Value Date    HGBA1C 9.3 (H) 05/02/2025       See AP     Review of Systems   Constitutional: Negative.    HENT:  Negative for sore throat, trouble swallowing and voice change.    Eyes:  Negative for photophobia, itching and visual disturbance.   Respiratory:  Negative for chest tightness and shortness of breath.    Cardiovascular:  Negative for chest pain and palpitations.   Gastrointestinal:  Negative for abdominal distention, abdominal pain, constipation, diarrhea, nausea and vomiting.   Endocrine: Negative for cold intolerance, heat intolerance and polyuria.   Genitourinary:  Negative for dysuria and frequency.   Musculoskeletal:  Negative for arthralgias.   Skin:  Negative for pallor.   Allergic/Immunologic: Negative for environmental allergies.   Neurological:  Negative for tremors, light-headedness and headaches.   Psychiatric/Behavioral:  Negative for agitation and sleep disturbance. The patient is not nervous/anxious.        Objective   Physical Exam  Constitutional:       Appearance: Normal appearance.   HENT:      Head: Normocephalic.      Nose: Nose normal.      Mouth/Throat:      Mouth: Mucous membranes are moist.   Eyes:      Extraocular Movements: Extraocular movements intact.   Cardiovascular:      Rate and Rhythm: Normal rate.   Pulmonary:      Effort: Pulmonary effort is normal. No respiratory distress.   Abdominal:      General: There is no  "distension.   Musculoskeletal:         General: Normal range of motion.      Cervical back: Normal range of motion and neck supple.   Skin:     General: Skin is warm and dry.   Neurological:      Mental Status: She is alert and oriented to person, place, and time.   Psychiatric:         Mood and Affect: Mood normal.    Visit Vitals  /86   Ht 1.651 m (5' 5\")   Wt 87.1 kg (192 lb)   LMP  (LMP Unknown)   BMI 31.95 kg/m²   OB Status Postmenopausal   BSA 2 m²        Assessment/Plan   Diagnoses and all orders for this visit:  Type 2 diabetes mellitus without complication, with long-term current use of insulin (Multi)  -     POCT glycosylated hemoglobin (Hb A1C) manually resulted  -     POCT fingerstick glucose manually resulted  -     glipiZIDE XL (Glucotrol XL) 2.5 mg 24 hr tablet; Take 1 tablet (2.5 mg) by mouth once daily with breakfast. Do not crush, chew, or split.  Hypothyroidism, unspecified type  Dyslipidemia              65 yo T2 DM ,gestational DM in '91.     Current regimen:    Toujeo 30 -0-0-0  Humalog 2 units plus sliding scale 2: 50 > 150  Metformin 1g BID  Mounajro 12.5mg weekly ( started sept 2023  ) lost ~20 lbs since 9/2023-mild constipation hemorrhoids, denies any side effect currently     She did had abd pain and was in ED after starting on mounjaro in 10/2023 but was found to have KIDNEY stones   No SE currently on mounjaro.  She does feel full    Past medication:   Jardiance was stopped due to multiple yeast infections, Humalog was restarted after that   Rybelsus - stopped due to SE on 14 mg dose , we tried 3 mg and still had a lot of GI issues mostly diarrhea   Glipizide even on higher doses did not help at all with the blood sugar control.  No side effects though on it.  Glipizide stopped after starting Humalog again    Pt continues using the Freestyle Ney 2 CGM, data reviewed today on her phone which was reviewed   She has significant day to day fluctuation. active time 98 %, TIR 26 % ,occ " hypoglycemia  She has fear of hypoglycemia     HgA1c worsened above goal      Plan:   Increase Mounjaro to 15 mg once weekly for better blood sugar control  Discussed side effects of Mounjaro in detail  Continue metformin  Decrease Toujeo to 26 units  Change Humalog to 4-2-2-0 , SSI 2: 50 > 150   Discussed hypoglycemia management as she does take 2 glucose tablets and more than 4 ounce juice when she has a low blood sugar.  Advised to do either 4 glucose tablets or 4 ounces of juice      - discussed lifestyle modifications.  - will discuss GVOKe next visit  -eye exam UTD   -negative microalbumin  -creatinine normal  -LDL and non-HDL cholesterol around goal, pt declined statin in past  -Pt instructed to call office with any hypoglycemia, hyperglycemia, or any other concerns     # hypothyroidism on replacement.    TSH 1.09 5/2/2025  Continue Synthroid 112mcg daily.      RTC 4m          SH-  on mounjaro as well with great control of DM.

## 2025-06-18 NOTE — PROGRESS NOTES
Patient: Alina Chavarria  : 1960 AGE: 64 y.o. SEX:female   MRN: [id number]   Provider: ABHILASH Arteaga-CNP     Location [facility]   Service Date: 2025     PCP: Meredith Darling MD   Referred by: No ref. provider found          [facility]  New Visit Note      HISTORY OF PRESENT ILLNESS     Alina Chavarria is a 64 y.o. female with a h/o Hypertension, Obesity, DUNCAN, and Diabetes who presents to [facility].    25: NPV with concerns of DUNCAN management. Previously followed by Dr. Escobedo. Brought machine into office visit today for DL. Dx with severe DUNCAN by PSG performed in  and has been on CPAP since then.  Her last CPAP machine broke and she received a replacement device earlier this year.  After receiving her replacement device she has found it more difficult to tolerate PAP due to waking after 2-3 hours of use with extremely dry mouth.  She self adjusted her pressure from CPAP 9 to 8 cm H2O.  Denies any pressure problem.  Using N30i nasal mask.  She is not using a chinstrap or mouth tape.  Unsure if she has mouth breathing.  She has lost 60 pounds over the past 1 year on Mounjaro.  Wondering if she still has sleep apnea.  Currently has humidifier and tube temperature off. ---> Given weight loss obtain HSAT for DUNCAN reevaluation.  Educated on immunity and tube temperature adjustment; both changed to auto today        SLEEP STUDY HISTORY (personally reviewed raw data such as interpretation report, data sheet, hypnogram, and titration table if available and applicable)  - PSG 2020 at [facility]-showing severe DUNCAN with AHI 32.5, SpO2 kassi 67%    SLEEP-WAKE SCHEDULE    Sleep Patterns: She does have a usual bed partner. In terms of the patient's sleep/wake cycle, she generally gets into bed at approximately 9 PM.  her latency to sleep onset after lights out is 30min. During the night, the patient generally awakens 1 times nightly.  These awakenings are usually brief in duration. Final wake time on weekday mornings is around 5:30 AM.    Compared to weekdays, the patient's sleep schedule is  similar on the weekends. Wake time 7AM.     Breathing during sleep: snoring (much less since weight loss)  Behaviors at night: No   Sleep paralysis: No   Hypnogogic or hypnopompic hallucinations: No   Cataplexy: No     RLS screen: Patient denies RLS symptoms.    Daytime Symptoms:  On awakening patient reports: waking refreshed    Sleep environment:  Preferred sleep position: side  Room is dark: Yes  Room is quiet: Yes  Room is cool: Yes  Bed comfort: good    SLEEP HABITS  Caffeine consumption: Yes, Patient consumes caffeine beverage regularly, about 2-3 cup(s)/day.  Alcohol consumption: Yes, Patient does consume alcohol, but not on a regular basis  Smoking: No  Marijuana: No  Sleep aids: denies     WEIGHT: lost 60lbs     ESS: 4  PRATIMA: 4      REVIEW OF SYSTEMS     All other systems have been reviewed and are negative.    ALLERGIES     Allergies[1]    MEDICATIONS     Current Medications[2]    PAST HISTORIES     PERTINENT PAST MEDICAL HISTORY: See HPI    PERTINENT PAST SURGICAL HISTORY for Sleep Medicine:  non-contributory    PERTINENT FAMILY HISTORY for Sleep Medicine:  Patient denies family history of any sleep disorder.    PERTINENT SOCIAL HISTORY:  She  reports that she has never smoked. She has never used smokeless tobacco. She reports that she does not drink alcohol and does not use drugs.     Active Problems, Allergy List, Medication List, and PMH/PSH/FH/Social Hx have been reviewed and reconciled in chart. No significant changes unless documented in the pertinent chart section. Updates made when necessary.     PHYSICAL EXAM     VITAL SIGNS: LMP  (LMP Unknown)     CURRENT WEIGHT: There were no vitals filed for this visit.     PREVIOUS WEIGHTS:  Wt Readings from Last 3 Encounters:   06/18/25 87.1 kg (192 lb)   02/19/25 88.9 kg (196 lb)   09/25/24 103 kg  "(228 lb)     Physical Exam  Constitutional: Awake, not in distress  Skin: Warm, no rash  Neuro: No tremors, moves all extremities  Psych: alert and oriented to time, place, and person    RESULTS/DATA     No results found for: \"IRON\", \"TRANSFERRIN\", \"IRONSAT\", \"TIBC\", \"FERRITIN\"    CARBON DIOXIDE   Date Value Ref Range Status   05/02/2025 27 20 - 32 mmol/L Final       ASSESSMENT/PLAN     Ms. Chavarria is a 64 y.o. female and She was referred to the [facility] for evaluation of DUNCAN    Problem List, Orders, Assessment, Recommendations:    #DUNCAN  - PSG 1/30/2020 at [facility]-showing severe DUNCAN with AHI 32.5, SpO2 kassi 67%  - Retrieved and personally reviewed recent PAP adherence download data today. See HPI.  - Previously with good compliance to PAP therapy, no recent use d/t waking with dry mouth  - Given weight loss obtain HSAT for DUNCAN reevaluation. Will call with results and determine next steps  - May resume use. continue current setting CPAP 8CWP  - For dry mouth; discussed humidity and tube temperature adjustment;; both changed to auto today.  May add chinstrap or mouth tape  - renew PAP supply orders, order placed to [facility] [place] [place]  - diet, exercise, and weight loss were emphasized today in clinic, as were non-supine sleep, avoiding alcohol in the late evening, and driving or operating heavy machinery when sleepy. Patient verbalized understanding.       #HTN  BP Readings from Last 1 Encounters:   06/18/25 148/86     - doing well, asymptomatic, denies any headache, blurry vision, chest pain, palpitation, dizziness, lightheadedness, or syncopal episodes  - discussed at length the impact of untreated DUNCAN and BP control  - supportive management: low salt DASH diet (less than 2000 mg sodium intake daily), moderate intensity aerobic exercise at least 30 minutes 5 days per week, reduce stress, quit smoking, limit alcohol, lose weight, and monitor BP once daily  - continue current management and " "follow-up with PCP     #Obesity  BMI Readings from Last 1 Encounters:   06/18/25 31.95 kg/m²   - Has lost 60lbs!  - Encouraged healthy weight loss via diet and exercise  - Weight loss can help in the long term treatment of DUNCAN.  - Defer management to PCP     All of patient's questions were answered. She verbalizes understanding and agreement with my assessment and plan.    Disposition    Will call with sleep study results and determine F/U plan    I personally spent 45 minutes today (exclusive of procedures) providing care for this patient, including preparation, face to face time, EMR documentation and other services such as review of medical records, diagnostic results, patient education, counseling, and coordination of care.              [1] No Known Allergies  [2]   Current Outpatient Medications   Medication Sig Dispense Refill    blood-glucose sensor (FreeStyle Ney 3 Plus Sensor) device Change sensor every 15 days 9 each 1    flash glucose sensor kit (FreeStyle Ney 2 Sensor) kit Change sensor EVERY 14 days 6 each 3    insulin lispro (HumaLOG KwikPen Insulin) 100 unit/mL pen Start humalog 4 units for breakfast, 4 units for lunch and 6 units before dinner plus sliding scale (MDD 44 units daily) 45 mL 0    metFORMIN (Glucophage) 1,000 mg tablet TAKE 1 TABLET BY MOUTH TWICE DAILY EVERY MORNING and late afternoon 180 tablet 1    pen needle, diabetic (Unifine Pentips Plus) 32 gauge x 5/32\" needle Use to administer insulin four times daily 400 each 1    Synthroid 112 mcg tablet TAKE 1 TABLET BY MOUTH EVERY MORNING 90 tablet 0    tirzepatide (Mounjaro) 15 mg/0.5 mL pen injector Inject 15 mg under the skin every 7 days. 6 mL 2    Toujeo SoloStar U-300 Insulin 300 unit/mL (1.5 mL) injection INJECT 40 UNITS SUBCUTANEOUSLY EVERY MORNING (Patient taking differently: 30 Units.) 36 mL 0    olmesartan (BENIcar) 40 mg tablet Take 1 tablet (40 mg) by mouth once daily.       No current facility-administered medications for " this visit.

## 2025-06-23 ENCOUNTER — PHARMACY VISIT (OUTPATIENT)
Dept: PHARMACY | Facility: CLINIC | Age: 65
End: 2025-06-23
Payer: COMMERCIAL

## 2025-06-25 ENCOUNTER — APPOINTMENT (OUTPATIENT)
Facility: CLINIC | Age: 65
End: 2025-06-25
Payer: COMMERCIAL

## 2025-06-25 VITALS
WEIGHT: 191 LBS | DIASTOLIC BLOOD PRESSURE: 80 MMHG | OXYGEN SATURATION: 97 % | BODY MASS INDEX: 31.82 KG/M2 | RESPIRATION RATE: 18 BRPM | HEART RATE: 89 BPM | HEIGHT: 65 IN | SYSTOLIC BLOOD PRESSURE: 122 MMHG

## 2025-06-25 DIAGNOSIS — E66.811 OBESITY (BMI 30.0-34.9): ICD-10-CM

## 2025-06-25 DIAGNOSIS — I10 BENIGN ESSENTIAL HYPERTENSION: ICD-10-CM

## 2025-06-25 DIAGNOSIS — G47.33 OSA (OBSTRUCTIVE SLEEP APNEA): Primary | ICD-10-CM

## 2025-06-25 PROCEDURE — 3008F BODY MASS INDEX DOCD: CPT | Performed by: NURSE PRACTITIONER

## 2025-06-25 PROCEDURE — 1036F TOBACCO NON-USER: CPT | Performed by: NURSE PRACTITIONER

## 2025-06-25 PROCEDURE — 3079F DIAST BP 80-89 MM HG: CPT | Performed by: NURSE PRACTITIONER

## 2025-06-25 PROCEDURE — G8433 SCR FOR DEP NOT CPT DOC RSN: HCPCS | Performed by: NURSE PRACTITIONER

## 2025-06-25 PROCEDURE — 3074F SYST BP LT 130 MM HG: CPT | Performed by: NURSE PRACTITIONER

## 2025-06-25 PROCEDURE — 99204 OFFICE O/P NEW MOD 45 MIN: CPT | Performed by: NURSE PRACTITIONER

## 2025-06-25 ASSESSMENT — SLEEP AND FATIGUE QUESTIONNAIRES
ESS-CHAD TOTAL SCORE: 4
HOW LIKELY ARE YOU TO NOD OFF OR FALL ASLEEP WHEN YOU ARE A PASSENGER IN A CAR FOR AN HOUR WITHOUT A BREAK: WOULD NEVER DOZE
SITING INACTIVE IN A PUBLIC PLACE LIKE A CLASS ROOM OR A MOVIE THEATER: WOULD NEVER DOZE
HOW LIKELY ARE YOU TO NOD OFF OR FALL ASLEEP WHILE SITTING AND READING: MODERATE CHANCE OF DOZING
HOW LIKELY ARE YOU TO NOD OFF OR FALL ASLEEP WHILE SITTING QUIETLY AFTER LUNCH WITHOUT ALCOHOL: WOULD NEVER DOZE
SATISFACTION_WITH_CURRENT_SLEEP_PATTERN: SATISFIED
HOW LIKELY ARE YOU TO NOD OFF OR FALL ASLEEP WHILE WATCHING TV: SLIGHT CHANCE OF DOZING
WORRIED_DISTRESSED_DUE_TO_SLEEP: A LITTLE
SLEEP_PROBLEM_INTERFERES_DAILY_ACTIVITIES: A LITTLE
HOW LIKELY ARE YOU TO NOD OFF OR FALL ASLEEP WHILE SITTING AND TALKING TO SOMEONE: WOULD NEVER DOZE
HOW LIKELY ARE YOU TO NOD OFF OR FALL ASLEEP IN A CAR, WHILE STOPPED FOR A FEW MINUTES IN TRAFFIC: WOULD NEVER DOZE
HOW LIKELY ARE YOU TO NOD OFF OR FALL ASLEEP WHILE LYING DOWN TO REST IN THE AFTERNOON WHEN CIRCUMSTANCES PERMIT: SLIGHT CHANCE OF DOZING
SLEEP_PROBLEM_NOTICEABLE_TO_OTHERS: A LITTLE

## 2025-06-25 ASSESSMENT — COLUMBIA-SUICIDE SEVERITY RATING SCALE - C-SSRS
6. HAVE YOU EVER DONE ANYTHING, STARTED TO DO ANYTHING, OR PREPARED TO DO ANYTHING TO END YOUR LIFE?: NO
2. HAVE YOU ACTUALLY HAD ANY THOUGHTS OF KILLING YOURSELF?: NO
1. IN THE PAST MONTH, HAVE YOU WISHED YOU WERE DEAD OR WISHED YOU COULD GO TO SLEEP AND NOT WAKE UP?: NO

## 2025-06-25 ASSESSMENT — PATIENT HEALTH QUESTIONNAIRE - PHQ9
2. FEELING DOWN, DEPRESSED OR HOPELESS: NOT AT ALL
1. LITTLE INTEREST OR PLEASURE IN DOING THINGS: NOT AT ALL
SUM OF ALL RESPONSES TO PHQ9 QUESTIONS 1 AND 2: 0

## 2025-06-25 ASSESSMENT — ENCOUNTER SYMPTOMS
DEPRESSION: 0
OCCASIONAL FEELINGS OF UNSTEADINESS: 0
LOSS OF SENSATION IN FEET: 0

## 2025-07-01 ENCOUNTER — OFFICE VISIT (OUTPATIENT)
Dept: URGENT CARE | Age: 65
End: 2025-07-01
Payer: COMMERCIAL

## 2025-07-01 VITALS
OXYGEN SATURATION: 97 % | DIASTOLIC BLOOD PRESSURE: 69 MMHG | HEART RATE: 82 BPM | WEIGHT: 190 LBS | RESPIRATION RATE: 16 BRPM | TEMPERATURE: 97.7 F | HEIGHT: 65 IN | SYSTOLIC BLOOD PRESSURE: 154 MMHG | BODY MASS INDEX: 31.65 KG/M2

## 2025-07-01 DIAGNOSIS — J98.8 RESPIRATORY TRACT INFECTION: Primary | ICD-10-CM

## 2025-07-01 DIAGNOSIS — R05.1 ACUTE COUGH: ICD-10-CM

## 2025-07-01 PROCEDURE — 3077F SYST BP >= 140 MM HG: CPT

## 2025-07-01 PROCEDURE — 87811 SARS-COV-2 COVID19 W/OPTIC: CPT

## 2025-07-01 PROCEDURE — 3008F BODY MASS INDEX DOCD: CPT

## 2025-07-01 PROCEDURE — 99213 OFFICE O/P EST LOW 20 MIN: CPT

## 2025-07-01 PROCEDURE — 3078F DIAST BP <80 MM HG: CPT

## 2025-07-01 PROCEDURE — 1036F TOBACCO NON-USER: CPT

## 2025-07-01 RX ORDER — BENZONATATE 200 MG/1
200 CAPSULE ORAL EVERY 8 HOURS PRN
Qty: 30 CAPSULE | Refills: 0 | Status: SHIPPED | OUTPATIENT
Start: 2025-07-01

## 2025-07-01 ASSESSMENT — ENCOUNTER SYMPTOMS
ABDOMINAL PAIN: 0
TROUBLE SWALLOWING: 0
FEVER: 0
VOMITING: 0
COUGH: 1
SHORTNESS OF BREATH: 0
SINUS PRESSURE: 0
PALPITATIONS: 0
SINUS PAIN: 0
WHEEZING: 0

## 2025-07-01 NOTE — PROGRESS NOTES
"Subjective   Patient ID: Alina Chavarria is a 64 y.o. female. They present today with a chief complaint of Illness (X3 weeks, pt was seen 3 weeks ago for same symptoms but have not gone away since ), Nasal Congestion, and Cough.    HISTORY OF PRESENT ILLNESS:    - Presents to the clinic for cough and upper respiratory congestion. States she was initially evaluated in this clinic 3 weeks ago and underwent a strep SPOTFIRE test at the time (all negative). Reports lingering cough since then, which seemed to become associated with increased upper respiratory congestion over the last 1 day. She would like to rule out COVID-19. No history of chronic respiratory issues. No fevers or sinus pain. Pt states cough has been interfering with sleep. Reports taking Dayquil earlier today.     Past Medical History  Allergies as of 07/01/2025    (No Known Allergies)       Current Outpatient Medications   Medication Instructions    benzonatate (TESSALON) 200 mg, oral, Every 8 hours PRN, Do not crush or chew.    blood-glucose sensor (FreeStyle Ney 3 Plus Sensor) device Change sensor every 15 days    flash glucose sensor kit (FreeStyle Ney 2 Sensor) kit Change sensor EVERY 14 days    insulin lispro (HumaLOG KwikPen Insulin) 100 unit/mL pen Start humalog 4 units for breakfast, 4 units for lunch and 6 units before dinner plus sliding scale (MDD 44 units daily)    metFORMIN (Glucophage) 1,000 mg tablet TAKE 1 TABLET BY MOUTH TWICE DAILY EVERY MORNING and late afternoon    Mounjaro 15 mg, subcutaneous, Every 7 days    olmesartan (BENICAR) 40 mg, oral, Daily    pen needle, diabetic (Unifine Pentips Plus) 32 gauge x 5/32\" needle Use to administer insulin four times daily    Synthroid 112 mcg, oral, Daily before breakfast    Toujeo SoloStar U-300 Insulin 300 unit/mL (1.5 mL) injection INJECT 40 UNITS SUBCUTANEOUSLY EVERY MORNING         Medical History[1]    Surgical History[2]     reports that she has never smoked. She has never used " "smokeless tobacco. She reports that she does not drink alcohol and does not use drugs.    Review of Systems    Review of Systems   Constitutional:  Negative for fever.   HENT:  Positive for congestion. Negative for drooling, sinus pressure, sinus pain and trouble swallowing.    Respiratory:  Positive for cough. Negative for shortness of breath and wheezing.    Cardiovascular:  Negative for chest pain and palpitations.   Gastrointestinal:  Negative for abdominal pain and vomiting.   Skin:  Negative for rash.                                 Objective    Vitals:    07/01/25 1203   BP: 154/69   BP Location: Right arm   Patient Position: Sitting   BP Cuff Size: Adult   Pulse: 82   Resp: 16   Temp: 36.5 °C (97.7 °F)   TempSrc: Temporal   SpO2: 97%   Weight: 86.2 kg (190 lb)   Height: 1.651 m (5' 5\")     No LMP recorded (lmp unknown). Patient is postmenopausal.  PHYSICAL EXAMINATION:    Physical Exam  Vitals and nursing note reviewed.   Constitutional:       General: She is not in acute distress.     Appearance: Normal appearance. She is not ill-appearing.   HENT:      Head: Normocephalic and atraumatic.      Right Ear: Tympanic membrane, ear canal and external ear normal.      Left Ear: Tympanic membrane, ear canal and external ear normal.      Nose: Nose normal.      Mouth/Throat:      Mouth: Mucous membranes are moist.      Pharynx: Oropharynx is clear. Posterior oropharyngeal erythema (mild) present. No oropharyngeal exudate.   Eyes:      General:         Right eye: No discharge.         Left eye: No discharge.      Extraocular Movements: Extraocular movements intact.      Conjunctiva/sclera: Conjunctivae normal.   Cardiovascular:      Rate and Rhythm: Normal rate and regular rhythm.      Heart sounds: Normal heart sounds.   Pulmonary:      Effort: Pulmonary effort is normal. No respiratory distress.      Breath sounds: Normal breath sounds. No stridor. No wheezing, rhonchi or rales.   Musculoskeletal:      Cervical " back: Normal range of motion and neck supple.   Lymphadenopathy:      Cervical: No cervical adenopathy.   Skin:     General: Skin is warm and dry.      Findings: No rash.   Neurological:      General: No focal deficit present.      Mental Status: She is alert and oriented to person, place, and time.   Psychiatric:         Mood and Affect: Mood normal.         Behavior: Behavior normal.          Procedures    No results found for this visit on 07/01/25.    Diagnostic study results (if any) were reviewed by Felipa Arroyo PA-C.    Assessment/Plan   Allergies, medications, history, and pertinent labs/EKGs/Imaging reviewed by Felipa Arroyo PA-C.     Orders and Diagnoses  Diagnoses and all orders for this visit:  Respiratory tract infection  Acute cough  -     POCT Covid-19 Rapid Antigen  -     benzonatate (Tessalon) 200 mg capsule; Take 1 capsule (200 mg) by mouth every 8 hours if needed for cough. Do not crush or chew.      Medical Admin Record    Given overall well appearance, vital signs, history, and exam as above, there is no indication for further emergent testing/intervention at this time.      I discussed with the patient my clinical thoughts at this time given the above and we had a shared decision-making conversation in a patient-centered decision-making model on how to proceed forward. Pt was instructed on the importance of close follow-up. They were told that an urgent care diagnosis is often a preliminary impression and that definitive care is often not able to be given in the urgent care setting. Pt was educated that close follow-up is essential for good health and good outcomes. Patient was provided with the following instructions:         May take benzonatate for symptomatic relief of dry cough as needed. Add guaifenesin for mucus clearance if needed.     Cool mist humidifier in room at night while sleeping. Sleep in semi elevated position.    Tea with honey, throat lozenges, vapor rub, voice rest.      Tylenol or ibuprofen for fevers/pain if needed.      Plenty of fluids and rest.      Good hand hygiene.      Follow up with PCP or RTC in the next 7-10 days if sx fail to show adequate improvement.        Clinical impression as well as limitations of available testing/examination, all discussed with patient. Pt is well at this time in the urgent care. They are comfortable with the present assessment and plan to be discharged home. Discussed with them close outpatient follow up, reassessment, and possible further testing/treatment via their PCP/specialist if symptoms fail to improve; they agree, understand, and are comfortable with this plan. Pt given the opportunity to ask questions prior to discharge and all questions were answered at this time. Via our discussion, the patient was advised of warning signs and instructions were reviewed. Strict ED precautions were given, acknowledged, and understood. Discussed with the patient/family that it is okay to return to the urgent care at any time for anything. Advised to present to the ED if present condition changes/worsens or if they develop new symptoms at any time after discharge. Also, advised to go to the ED if they cannot establish outpatient follow-up in time frame specified above. Pt verbalized understanding and agreement with plan and instructions. Discussed the need for close follow up with their primary care provider and/or specialist for further testing/treatment/care/consultation in the short time frame as noted above, if needed - they understand these instructions and agree to close follow up for these reasons. Patient discharged home in stable condition.      Follow Up Instructions  No follow-ups on file.    Patient disposition: Home    Electronically signed by Felipa Arroyo PA-C  12:36 PM         [1] No past medical history on file.  [2] No past surgical history on file.

## 2025-07-09 ENCOUNTER — PATIENT MESSAGE (OUTPATIENT)
Dept: ENDOCRINOLOGY | Facility: CLINIC | Age: 65
End: 2025-07-09
Payer: COMMERCIAL

## 2025-07-09 DIAGNOSIS — Z79.4 TYPE 2 DIABETES MELLITUS WITHOUT COMPLICATION, WITH LONG-TERM CURRENT USE OF INSULIN: Primary | ICD-10-CM

## 2025-07-09 DIAGNOSIS — E11.9 TYPE 2 DIABETES MELLITUS WITHOUT COMPLICATION, WITH LONG-TERM CURRENT USE OF INSULIN: Primary | ICD-10-CM

## 2025-07-09 RX ORDER — TIRZEPATIDE 12.5 MG/.5ML
12.5 INJECTION, SOLUTION SUBCUTANEOUS
Qty: 6 ML | Refills: 2 | Status: SHIPPED | OUTPATIENT
Start: 2025-07-09 | End: 2025-07-10 | Stop reason: SDUPTHER

## 2025-07-10 ENCOUNTER — APPOINTMENT (OUTPATIENT)
Dept: PHARMACY | Facility: HOSPITAL | Age: 65
End: 2025-07-10
Payer: COMMERCIAL

## 2025-07-10 DIAGNOSIS — Z79.4 TYPE 2 DIABETES MELLITUS WITHOUT COMPLICATION, WITH LONG-TERM CURRENT USE OF INSULIN: ICD-10-CM

## 2025-07-10 DIAGNOSIS — E11.9 TYPE 2 DIABETES MELLITUS WITHOUT COMPLICATION, WITH LONG-TERM CURRENT USE OF INSULIN: ICD-10-CM

## 2025-07-10 DIAGNOSIS — E11.9 TYPE 2 DIABETES MELLITUS WITHOUT COMPLICATION, WITHOUT LONG-TERM CURRENT USE OF INSULIN: ICD-10-CM

## 2025-07-10 PROCEDURE — RXMED WILLOW AMBULATORY MEDICATION CHARGE

## 2025-07-10 RX ORDER — TIRZEPATIDE 12.5 MG/.5ML
12.5 INJECTION, SOLUTION SUBCUTANEOUS
Qty: 6 ML | Refills: 2 | Status: SHIPPED | OUTPATIENT
Start: 2025-07-10

## 2025-07-10 NOTE — PROGRESS NOTES
Patient ID: Alina Chavarria is a 64 y.o. female who presents for Diabetes.    Referring Provider: Lizeth Ward MD  PCP: Meredith Darling MD       Subjective   Preferred pharmacy: [facility] except for [facility]o to [facility]   Can patient afford prescribed medications: Yes, but does note that Mounjaro is expensive      Diabetes  She has type 2 diabetes mellitus. Her disease course has been worsening (Last HbA1c up to 9.3%). Current diabetic treatments: Toujeo 30 units daily, metformin 1g BID, Mounjaro 15 mg weekly, and Humalog 1 unit plus sliding scale breakfast, 2 units plus scale with lunch, and just sliding scale with dinner (mostly just sliding scale) (Patient currently using Freestyle kaden 2 to monitor blood sugars. Her blood sugars have been largely uncontrolled. She has continued to have overnight lows as well as high blood sugars in 200-300s throughout the day. However she is less 300s than before.)       Tried to go up to Mounjaro 15mg but felt nauseous and sick. Had 2 doses then asked Dr. Ward to write for 12.5mg. Did not think it made a substantial difference in blood sugars.  No side effects on 12.5mg. No side effects  A lot of lows at Toujeo 35 units  Does not understand why she needs to give insulin at mealtimes if her sugar is ~100mg/dL  Humalog sliding scale: Typically 4-8 units before meals. Supposed to wait 15 minutes. Does not always do that.  Lost 60 pounds on Mounjaro. Thought that this would help more with BG    Current diet:   Overall: eats light breakfast and two larger meals a day, but overall is eating less due to lower appetite with Mounjaro   Breakfast (7am): Toast + fruit  Lunch (11-12): sandwich, or salad, pretzels  Dinner (after 5 pm): protein, vegetable, salad     Current exercise: none - works full-time, occasionally walking dogs     Patient is using: continuous glucose monitor    Hypoglycemia frequency: Multiple in last week   Hypoglycemia awareness: Yes     Past  "pharmacotherapy:  Jardiance - stopped due to multiple yeast infections         Objective     Primary/Secondary Prevention   - Statin? No  - ACE-I/ARB? Yes  - Aspirin? No    Pertinent PMH Review:  - PMH of Pancreatitis: No  - PMH of Retinopathy: No  - PMH of MTC: No    LMP  (LMP Unknown)    BP Readings from Last 4 Encounters:   07/01/25 154/69   06/25/25 122/80   06/18/25 148/86   06/10/25 162/90      There were no vitals filed for this visit.     Labs  Lab Results   Component Value Date    BILITOT 0.5 05/02/2025    CALCIUM 9.3 05/02/2025    CO2 27 05/02/2025     05/02/2025    CREATININE 0.43 (L) 05/02/2025    GLUCOSE 128 (H) 05/02/2025    ALKPHOS 78 05/02/2025    K 5.2 05/02/2025    PROT 6.3 05/02/2025     05/02/2025    AST 9 (L) 05/02/2025    ALT 10 05/02/2025    BUN 13 05/02/2025    ANIONGAP 11 05/02/2025    PHOS 3.7 10/17/2024    ALBUMIN 4.3 05/02/2025    AMYLASE 25 (L) 05/28/2024    LIPASE 18 05/28/2024    GFRF >90 09/19/2023     Lab Results   Component Value Date    TRIG 73 05/02/2025    CHOL 162 05/02/2025    LDLCALC 70 05/02/2025    HDL 77 05/02/2025     Lab Results   Component Value Date    HGBA1C 9.3 (H) 05/02/2025       Current Outpatient Medications   Medication Instructions    benzonatate (TESSALON) 200 mg, oral, Every 8 hours PRN, Do not crush or chew.    blood-glucose sensor (FreeStyle Ney 3 Plus Sensor) device Change sensor every 15 days    flash glucose sensor kit (FreeStyle Ney 2 Sensor) kit Change sensor EVERY 14 days    insulin lispro (HumaLOG KwikPen Insulin) 100 unit/mL pen Start humalog 4 units for breakfast, 4 units for lunch and 6 units before dinner plus sliding scale (MDD 44 units daily)    metFORMIN (Glucophage) 1,000 mg tablet TAKE 1 TABLET BY MOUTH TWICE DAILY EVERY MORNING and late afternoon    Mounjaro 12.5 mg, subcutaneous, Every 7 days    olmesartan (BENICAR) 40 mg, oral, Daily    pen needle, diabetic (Unifine Pentips Plus) 32 gauge x 5/32\" needle Use to administer " insulin four times daily    Synthroid 112 mcg, oral, Daily before breakfast    Toujeo SoloStar U-300 Insulin 300 unit/mL (1.5 mL) injection INJECT 40 UNITS SUBCUTANEOUSLY EVERY MORNING         Drug Interactions;  None at time of review    Assessment/Plan   Problem List Items Addressed This Visit    None  Visit Diagnoses         Type 2 diabetes mellitus without complication, without long-term current use of insulin        Relevant Orders    Referral to Clinical Pharmacy   Patient's DM2 is uncontrolled, and she follows with Dr. Ward for her diabetes management. She was referred for meal-time insulin titration. Her blood sugars have been significantly elevated recently increasing into the 200-300s during the day. Overnight lows have largely resolved since reducing Toujeo to 30 units. Will plan to keep Toujeo at 30 units daily. Had in-depth discussion about the need to cover meals with base humalog dose and correct elevation with sliding scale. Explained that base dose and sliding scale 15 minutes before meal is critical to prevent daytime spikes. Patient has not experienced any daytime lows in the last 2 weeks. Encouraged patient to try taking Humalog as prescribed.     Also discussed possibility of insulin pump to relieve decision-making fatigue. Omnipod appears to be completely covered by insurance.    1. CONTINUE Toujeo 30 units once daily   2. CONTINUE Humalog 1 unit plus sliding scale with breakfast, 2 units plus sliding scale with lunch, and just sliding scale dinner  3. CONTINUE metformin 1 g BID and Mounjaro 12.5 mg daily         Follow-up: with pharmacy week of  8/7 11:20      Time spent with pt: Total length of time 25 (minutes) of the encounter and more than 50% was spent counseling the patient.    Mariia Luis, PharmD    Continue all meds under the continuation of care with the referring provider and clinical pharmacy team.

## 2025-07-12 ENCOUNTER — PHARMACY VISIT (OUTPATIENT)
Dept: PHARMACY | Facility: CLINIC | Age: 65
End: 2025-07-12
Payer: COMMERCIAL

## 2025-07-17 ENCOUNTER — CLINICAL SUPPORT (OUTPATIENT)
Dept: SLEEP MEDICINE | Facility: HOSPITAL | Age: 65
End: 2025-07-17
Payer: COMMERCIAL

## 2025-07-17 DIAGNOSIS — G47.33 OSA (OBSTRUCTIVE SLEEP APNEA): ICD-10-CM

## 2025-07-17 PROCEDURE — 95806 SLEEP STUDY UNATT&RESP EFFT: CPT | Performed by: INTERNAL MEDICINE

## 2025-07-21 ENCOUNTER — TELEPHONE (OUTPATIENT)
Dept: ENDOCRINOLOGY | Facility: CLINIC | Age: 65
End: 2025-07-21
Payer: COMMERCIAL

## 2025-07-21 NOTE — TELEPHONE ENCOUNTER
Pt is having bad side effects from mounjaro has been have watery poops for 3 weeks she lowered mounjaro dose but is still having bad side effects

## 2025-07-22 ENCOUNTER — HOSPITAL ENCOUNTER (EMERGENCY)
Facility: HOSPITAL | Age: 65
Discharge: HOME | End: 2025-07-22
Attending: EMERGENCY MEDICINE
Payer: COMMERCIAL

## 2025-07-22 VITALS
HEIGHT: 65 IN | RESPIRATION RATE: 18 BRPM | TEMPERATURE: 97.5 F | WEIGHT: 183 LBS | BODY MASS INDEX: 30.49 KG/M2 | SYSTOLIC BLOOD PRESSURE: 123 MMHG | DIASTOLIC BLOOD PRESSURE: 54 MMHG | OXYGEN SATURATION: 98 % | HEART RATE: 100 BPM

## 2025-07-22 DIAGNOSIS — E11.9 TYPE 2 DIABETES MELLITUS WITHOUT COMPLICATION, WITH LONG-TERM CURRENT USE OF INSULIN: Primary | ICD-10-CM

## 2025-07-22 DIAGNOSIS — R19.7 DIARRHEA, UNSPECIFIED TYPE: Primary | ICD-10-CM

## 2025-07-22 DIAGNOSIS — Z79.4 TYPE 2 DIABETES MELLITUS WITHOUT COMPLICATION, WITH LONG-TERM CURRENT USE OF INSULIN: Primary | ICD-10-CM

## 2025-07-22 LAB
ALBUMIN SERPL BCP-MCNC: 4 G/DL (ref 3.4–5)
ALP SERPL-CCNC: 92 U/L (ref 33–136)
ALT SERPL W P-5'-P-CCNC: 80 U/L (ref 7–45)
ANION GAP SERPL CALC-SCNC: 13 MMOL/L (ref 10–20)
AST SERPL W P-5'-P-CCNC: 39 U/L (ref 9–39)
BASOPHILS # BLD AUTO: 0.02 X10*3/UL (ref 0–0.1)
BASOPHILS NFR BLD AUTO: 0.3 %
BILIRUB SERPL-MCNC: 1.3 MG/DL (ref 0–1.2)
BUN SERPL-MCNC: 13 MG/DL (ref 6–23)
CALCIUM SERPL-MCNC: 9 MG/DL (ref 8.6–10.3)
CHLORIDE SERPL-SCNC: 112 MMOL/L (ref 98–107)
CO2 SERPL-SCNC: 19 MMOL/L (ref 21–32)
CREAT SERPL-MCNC: 0.88 MG/DL (ref 0.5–1.05)
EGFRCR SERPLBLD CKD-EPI 2021: 73 ML/MIN/1.73M*2
EOSINOPHIL # BLD AUTO: 0.21 X10*3/UL (ref 0–0.7)
EOSINOPHIL NFR BLD AUTO: 3 %
ERYTHROCYTE [DISTWIDTH] IN BLOOD BY AUTOMATED COUNT: 13.4 % (ref 11.5–14.5)
GLUCOSE SERPL-MCNC: 252 MG/DL (ref 74–99)
HCT VFR BLD AUTO: 41.7 % (ref 36–46)
HGB BLD-MCNC: 13 G/DL (ref 12–16)
HOLD SPECIMEN: NORMAL
IMM GRANULOCYTES # BLD AUTO: 0.01 X10*3/UL (ref 0–0.7)
IMM GRANULOCYTES NFR BLD AUTO: 0.1 % (ref 0–0.9)
LIPASE SERPL-CCNC: 6 U/L (ref 9–82)
LYMPHOCYTES # BLD AUTO: 1.42 X10*3/UL (ref 1.2–4.8)
LYMPHOCYTES NFR BLD AUTO: 20.5 %
MAGNESIUM SERPL-MCNC: 1.67 MG/DL (ref 1.6–2.4)
MCH RBC QN AUTO: 28.7 PG (ref 26–34)
MCHC RBC AUTO-ENTMCNC: 31.2 G/DL (ref 32–36)
MCV RBC AUTO: 92 FL (ref 80–100)
MONOCYTES # BLD AUTO: 0.53 X10*3/UL (ref 0.1–1)
MONOCYTES NFR BLD AUTO: 7.6 %
NEUTROPHILS # BLD AUTO: 4.75 X10*3/UL (ref 1.2–7.7)
NEUTROPHILS NFR BLD AUTO: 68.5 %
NRBC BLD-RTO: 0 /100 WBCS (ref 0–0)
PLATELET # BLD AUTO: 415 X10*3/UL (ref 150–450)
POTASSIUM SERPL-SCNC: 3.8 MMOL/L (ref 3.5–5.3)
PROT SERPL-MCNC: 6.5 G/DL (ref 6.4–8.2)
RBC # BLD AUTO: 4.53 X10*6/UL (ref 4–5.2)
SODIUM SERPL-SCNC: 140 MMOL/L (ref 136–145)
WBC # BLD AUTO: 6.9 X10*3/UL (ref 4.4–11.3)

## 2025-07-22 PROCEDURE — 96361 HYDRATE IV INFUSION ADD-ON: CPT

## 2025-07-22 PROCEDURE — 36415 COLL VENOUS BLD VENIPUNCTURE: CPT | Performed by: EMERGENCY MEDICINE

## 2025-07-22 PROCEDURE — 80053 COMPREHEN METABOLIC PANEL: CPT

## 2025-07-22 PROCEDURE — 83690 ASSAY OF LIPASE: CPT

## 2025-07-22 PROCEDURE — 85025 COMPLETE CBC W/AUTO DIFF WBC: CPT

## 2025-07-22 PROCEDURE — 2500000004 HC RX 250 GENERAL PHARMACY W/ HCPCS (ALT 636 FOR OP/ED)

## 2025-07-22 PROCEDURE — 96360 HYDRATION IV INFUSION INIT: CPT

## 2025-07-22 PROCEDURE — 99284 EMERGENCY DEPT VISIT MOD MDM: CPT | Performed by: EMERGENCY MEDICINE

## 2025-07-22 PROCEDURE — 99284 EMERGENCY DEPT VISIT MOD MDM: CPT | Mod: 25 | Performed by: EMERGENCY MEDICINE

## 2025-07-22 PROCEDURE — 83735 ASSAY OF MAGNESIUM: CPT

## 2025-07-22 PROCEDURE — 36415 COLL VENOUS BLD VENIPUNCTURE: CPT

## 2025-07-22 RX ORDER — METFORMIN HYDROCHLORIDE 500 MG/1
1000 TABLET, EXTENDED RELEASE ORAL 2 TIMES DAILY
Qty: 360 TABLET | Refills: 3 | Status: SHIPPED | OUTPATIENT
Start: 2025-07-22 | End: 2026-07-22

## 2025-07-22 RX ORDER — TIRZEPATIDE 10 MG/.5ML
10 INJECTION, SOLUTION SUBCUTANEOUS
Qty: 4 ML | Refills: 2 | Status: SHIPPED | OUTPATIENT
Start: 2025-07-22 | End: 2026-03-31

## 2025-07-22 RX ADMIN — SODIUM CHLORIDE, SODIUM LACTATE, POTASSIUM CHLORIDE, AND CALCIUM CHLORIDE 1000 ML: .6; .31; .03; .02 INJECTION, SOLUTION INTRAVENOUS at 12:23

## 2025-07-22 ASSESSMENT — PAIN SCALES - GENERAL: PAINLEVEL_OUTOF10: 0 - NO PAIN

## 2025-07-22 ASSESSMENT — PAIN - FUNCTIONAL ASSESSMENT: PAIN_FUNCTIONAL_ASSESSMENT: 0-10

## 2025-07-22 NOTE — ED PROVIDER NOTES
Emergency Department Provider Note        History of Present Illness     History provided by: Patient  Limitations to History: None  External Records Reviewed with Brief Summary: Sleep medicine/pulm note on 6/25/2025 with past medical history    HPI:  Alina Chavarria is a 64 y.o. female past medical history significant for hypertension, DUNCAN, diabetes on Mounjaro, hypothyroidism, hyperlipidemia presenting to the emergency department due to diarrhea x 3 weeks.  Her stools have been loose/watery for the last 3 weeks.  She did have a week of Augmentin for which diarrhea started during that time, she also had an increased dose of Mounjaro prior to her diarrhea.  Subsequently has been told to stop her metformin and stop Mounjaro until diarrhea has resolved.  She denies any blood in the stool, she has no headache, chest pain, shortness of breath, abdominal pain, pain or discomfort with bowel movements, pain or discomfort with urination, nausea or vomiting or difficulty tolerating p.o.  Physical Exam   Triage vitals:  T 36.4 °C (97.5 °F)    /54  RR 18  O2 98 % None (Room air)    Physical Exam  Constitutional:       Appearance: Normal appearance.   HENT:      Head: Normocephalic and atraumatic.      Nose: Nose normal.      Mouth/Throat:      Mouth: Mucous membranes are moist.     Eyes:      Extraocular Movements: Extraocular movements intact.      Pupils: Pupils are equal, round, and reactive to light.       Cardiovascular:      Rate and Rhythm: Normal rate and regular rhythm.      Pulses: Normal pulses.      Heart sounds: Normal heart sounds.   Pulmonary:      Effort: Pulmonary effort is normal.      Breath sounds: Normal breath sounds.   Abdominal:      General: Abdomen is flat.      Palpations: Abdomen is soft.      Tenderness: There is no abdominal tenderness.     Musculoskeletal:         General: Normal range of motion.      Cervical back: Normal range of motion.     Skin:     General: Skin is warm.       Capillary Refill: Capillary refill takes less than 2 seconds.      Comments: Decreased skin turgor     Neurological:      Mental Status: She is alert.          Medical Decision Making & ED Course   Medical Decision Makin y.o. female with past medical history and HPI as described above.  Workup to include CBC, CMP, mag, lipase, and urinalysis.  Given history of antibiotics we will obtain a C. difficile PCR as well as stool pathogen PCR if patient able to provide sample.  Patient is hemodynamically stable however has borderline tachycardia and decreased skin turgor therefore will provide 1 L lactated Ringer's bolus while waiting for workup.  Workup notable for hyperglycemia to 252, hypochloremia to 119 and low bicarb to 19.  Otherwise CBC unremarkable, mag 1.67.  Patient received 1 L fluids, did feel some symptomatic improvement.  Given no leukocytosis unlikely to be infectious diarrhea.  Patient advised to follow-up with GI for colonoscopy given family history of colon cancer and change in bowel habits.  Patient advised to take Pepto-Bismol for diarrhea and follow-up with primary care physician regarding further supportive care.  Patient discharged in stable condition.  ----    Differential diagnoses considered include but are not limited to: Inflammatory diarrhea, infectious diarrhea, IBS, Crohn's, colon cancer, colitis, enteritis, gastroenteritis, norovirus, adenovirus, medication side effect     Social Determinants of Health which Significantly Impact Care: None identified     EKG Independent Interpretation: EKG not obtained    Independent Result Review and Interpretation: Relevant laboratory and radiographic results were reviewed and independently interpreted by myself.  As necessary, they are commented on in the ED Course.    Chronic conditions affecting the patient's care: As documented above in MDM    The patient was discussed with the following consultants/services: None    Care Considerations: As  documented above in [facility]    ED Course:  Diagnoses as of 07/22/25 2123   Diarrhea, unspecified type     Disposition   Discharge    Procedures   Procedures    Patient seen and discussed with ED attending physician.    Aguila Munroe MD  Emergency Medicine     Aguila Munroe MD  Resident  07/22/25 2124

## 2025-07-22 NOTE — DISCHARGE INSTRUCTIONS
Use Pepto-Bismol initially for diarrhea.  Follow-up with GI as you likely need a colonoscopy.  Follow-up with your PCP should diarrhea continue despite holding medications plus Pepto-Bismol.    Please return to the ER or seek immediate medical attention if you experience new or worsening abdominal pain, persistent vomiting, persistent diarrhea, black tar stools, fever of 38C (100.4) or higher, chest pain, shortness of breath, or worsening of your current symptoms.    You are welcome back any time. Thank you for entrusting your care to us, I hope we made your visit as pleasant as possible. Wishing you well!    Dr. Munroe

## 2025-07-23 LAB — HOLD SPECIMEN: NORMAL

## 2025-07-23 PROCEDURE — RXMED WILLOW AMBULATORY MEDICATION CHARGE

## 2025-07-29 ENCOUNTER — TELEPHONE (OUTPATIENT)
Dept: SLEEP MEDICINE | Facility: HOSPITAL | Age: 65
End: 2025-07-29
Payer: COMMERCIAL

## 2025-07-29 NOTE — TELEPHONE ENCOUNTER
----- Message from Josselyn Romero sent at 7/28/2025  8:55 PM EDT -----    Can you call this patient about their home sleep apnea test (HSAT) and tell her that her previous severe sleep apnea has now improved to only the mild range due to her significant weight loss and now treatment is optional. I recommend patient to trial a break from CPAP for up to 2 weeks to see how she feels without therapy, if DUNCAN symptoms (awakenings, morning headaches, daytime sleepiness, etc) return, she should continue PAP for now. If she feels the same without PAP, may discontinue use and treat with conservative management. I'd like her to send a [employer] message update within the next 2 weeks.       Thanks,  Josselyn

## 2025-07-30 ENCOUNTER — PHARMACY VISIT (OUTPATIENT)
Dept: PHARMACY | Facility: CLINIC | Age: 65
End: 2025-07-30
Payer: COMMERCIAL

## 2025-08-01 ENCOUNTER — APPOINTMENT (OUTPATIENT)
Dept: RADIOLOGY | Facility: HOSPITAL | Age: 65
End: 2025-08-01
Payer: COMMERCIAL

## 2025-08-01 ENCOUNTER — APPOINTMENT (OUTPATIENT)
Dept: CARDIOLOGY | Facility: HOSPITAL | Age: 65
End: 2025-08-01
Payer: COMMERCIAL

## 2025-08-01 ENCOUNTER — HOSPITAL ENCOUNTER (OUTPATIENT)
Facility: HOSPITAL | Age: 65
Setting detail: OBSERVATION
Discharge: HOME | End: 2025-08-02
Attending: STUDENT IN AN ORGANIZED HEALTH CARE EDUCATION/TRAINING PROGRAM | Admitting: REGISTERED NURSE
Payer: COMMERCIAL

## 2025-08-01 DIAGNOSIS — A04.5: ICD-10-CM

## 2025-08-01 DIAGNOSIS — R73.9 HYPERGLYCEMIA: ICD-10-CM

## 2025-08-01 DIAGNOSIS — E83.42 HYPOMAGNESEMIA: ICD-10-CM

## 2025-08-01 DIAGNOSIS — R53.1 GENERALIZED WEAKNESS: Primary | ICD-10-CM

## 2025-08-01 DIAGNOSIS — E87.6 HYPOKALEMIA: ICD-10-CM

## 2025-08-01 LAB
ALBUMIN SERPL BCP-MCNC: 3.9 G/DL (ref 3.4–5)
ALP SERPL-CCNC: 114 U/L (ref 33–136)
ALT SERPL W P-5'-P-CCNC: 54 U/L (ref 7–45)
ANION GAP BLDV CALCULATED.4IONS-SCNC: 10 MMOL/L (ref 10–25)
ANION GAP BLDV CALCULATED.4IONS-SCNC: 14 MMOL/L (ref 10–25)
ANION GAP SERPL CALC-SCNC: 19 MMOL/L (ref 10–20)
APPEARANCE UR: ABNORMAL
AST SERPL W P-5'-P-CCNC: 22 U/L (ref 9–39)
ATRIAL RATE: 88 BPM
ATRIAL RATE: 90 BPM
BACTERIA #/AREA URNS AUTO: ABNORMAL /HPF
BASE EXCESS BLDV CALC-SCNC: -13.7 MMOL/L (ref -2–3)
BASE EXCESS BLDV CALC-SCNC: -3.3 MMOL/L (ref -2–3)
BASOPHILS # BLD AUTO: 0.03 X10*3/UL (ref 0–0.1)
BASOPHILS NFR BLD AUTO: 0.2 %
BILIRUB SERPL-MCNC: 0.9 MG/DL (ref 0–1.2)
BILIRUB UR STRIP.AUTO-MCNC: NEGATIVE MG/DL
BODY TEMPERATURE: ABNORMAL
BODY TEMPERATURE: ABNORMAL
BUN SERPL-MCNC: 12 MG/DL (ref 6–23)
C DIF TOX TCDA+TCDB STL QL NAA+PROBE: NOT DETECTED
CA-I BLDV-SCNC: 0.69 MMOL/L (ref 1.1–1.33)
CA-I BLDV-SCNC: 1.17 MMOL/L (ref 1.1–1.33)
CALCIUM SERPL-MCNC: 8.9 MG/DL (ref 8.6–10.3)
CAOX CRY #/AREA UR COMP ASSIST: ABNORMAL /HPF
CARDIAC TROPONIN I PNL SERPL HS: 3 NG/L (ref 0–13)
CARDIAC TROPONIN I PNL SERPL HS: 4 NG/L (ref 0–13)
CHLORIDE BLDV-SCNC: 101 MMOL/L (ref 98–107)
CHLORIDE BLDV-SCNC: 122 MMOL/L (ref 98–107)
CHLORIDE SERPL-SCNC: 102 MMOL/L (ref 98–107)
CO2 SERPL-SCNC: 20 MMOL/L (ref 21–32)
COLOR UR: YELLOW
CREAT SERPL-MCNC: 1.01 MG/DL (ref 0.5–1.05)
CRITICAL CALL TIME: 1442
CRITICAL CALLED BY: ABNORMAL
CRITICAL CALLED TO: ABNORMAL
CRITICAL READ BACK: ABNORMAL
EGFRCR SERPLBLD CKD-EPI 2021: 62 ML/MIN/1.73M*2
EOSINOPHIL # BLD AUTO: 0.08 X10*3/UL (ref 0–0.7)
EOSINOPHIL NFR BLD AUTO: 0.5 %
ERYTHROCYTE [DISTWIDTH] IN BLOOD BY AUTOMATED COUNT: 13.2 % (ref 11.5–14.5)
GLUCOSE BLD MANUAL STRIP-MCNC: 341 MG/DL (ref 74–99)
GLUCOSE BLD MANUAL STRIP-MCNC: 352 MG/DL (ref 74–99)
GLUCOSE BLDV-MCNC: 198 MG/DL (ref 74–99)
GLUCOSE BLDV-MCNC: 388 MG/DL (ref 74–99)
GLUCOSE SERPL-MCNC: 328 MG/DL (ref 74–99)
GLUCOSE UR STRIP.AUTO-MCNC: ABNORMAL MG/DL
HCO3 BLDV-SCNC: 11.1 MMOL/L (ref 22–26)
HCO3 BLDV-SCNC: 22.1 MMOL/L (ref 22–26)
HCT VFR BLD AUTO: 44 % (ref 36–46)
HCT VFR BLD EST: 24 % (ref 36–46)
HCT VFR BLD EST: 38 % (ref 36–46)
HGB BLD-MCNC: 14.2 G/DL (ref 12–16)
HGB BLDV-MCNC: 12.6 G/DL (ref 12–16)
HGB BLDV-MCNC: 8 G/DL (ref 12–16)
HYALINE CASTS #/AREA URNS AUTO: ABNORMAL /LPF
IMM GRANULOCYTES # BLD AUTO: 0.05 X10*3/UL (ref 0–0.7)
IMM GRANULOCYTES NFR BLD AUTO: 0.3 % (ref 0–0.9)
INHALED O2 CONCENTRATION: 21 %
INHALED O2 CONCENTRATION: 21 %
KETONES UR STRIP.AUTO-MCNC: ABNORMAL MG/DL
LACTATE BLDV-SCNC: 1.6 MMOL/L (ref 0.4–2)
LACTATE BLDV-SCNC: 2.2 MMOL/L (ref 0.4–2)
LEUKOCYTE ESTERASE UR QL STRIP.AUTO: ABNORMAL
LYMPHOCYTES # BLD AUTO: 0.51 X10*3/UL (ref 1.2–4.8)
LYMPHOCYTES NFR BLD AUTO: 3.4 %
MAGNESIUM SERPL-MCNC: 1.5 MG/DL (ref 1.6–2.4)
MCH RBC QN AUTO: 29.1 PG (ref 26–34)
MCHC RBC AUTO-ENTMCNC: 32.3 G/DL (ref 32–36)
MCV RBC AUTO: 90 FL (ref 80–100)
MONOCYTES # BLD AUTO: 0.61 X10*3/UL (ref 0.1–1)
MONOCYTES NFR BLD AUTO: 4.1 %
MUCOUS THREADS #/AREA URNS AUTO: ABNORMAL /LPF
NEUTROPHILS # BLD AUTO: 13.59 X10*3/UL (ref 1.2–7.7)
NEUTROPHILS NFR BLD AUTO: 91.5 %
NITRITE UR QL STRIP.AUTO: NEGATIVE
NRBC BLD-RTO: 0 /100 WBCS (ref 0–0)
OXYHGB MFR BLDV: 37.2 % (ref 45–75)
OXYHGB MFR BLDV: 37.2 % (ref 45–75)
P AXIS: 75 DEGREES
P AXIS: 76 DEGREES
P OFFSET: 191 MS
P OFFSET: 197 MS
P ONSET: 143 MS
P ONSET: 143 MS
PCO2 BLDV: 22 MM HG (ref 41–51)
PCO2 BLDV: 40 MM HG (ref 41–51)
PH BLDV: 7.31 PH (ref 7.33–7.43)
PH BLDV: 7.35 PH (ref 7.33–7.43)
PH UR STRIP.AUTO: 5.5 [PH]
PHOSPHATE SERPL-MCNC: 3 MG/DL (ref 2.5–4.9)
PLATELET # BLD AUTO: 444 X10*3/UL (ref 150–450)
PO2 BLDV: 24 MM HG (ref 35–45)
PO2 BLDV: 24 MM HG (ref 35–45)
POTASSIUM BLDV-SCNC: 1.5 MMOL/L (ref 3.5–5.3)
POTASSIUM BLDV-SCNC: 4 MMOL/L (ref 3.5–5.3)
POTASSIUM SERPL-SCNC: 3.4 MMOL/L (ref 3.5–5.3)
PR INTERVAL: 156 MS
PR INTERVAL: 160 MS
PROT SERPL-MCNC: 6.7 G/DL (ref 6.4–8.2)
PROT UR STRIP.AUTO-MCNC: ABNORMAL MG/DL
Q ONSET: 221 MS
Q ONSET: 223 MS
QRS COUNT: 14 BEATS
QRS COUNT: 15 BEATS
QRS DURATION: 70 MS
QRS DURATION: 78 MS
QT INTERVAL: 372 MS
QT INTERVAL: 376 MS
QTC CALCULATION(BAZETT): 450 MS
QTC CALCULATION(BAZETT): 459 MS
QTC FREDERICIA: 422 MS
QTC FREDERICIA: 430 MS
R AXIS: 86 DEGREES
R AXIS: 90 DEGREES
RBC # BLD AUTO: 4.88 X10*6/UL (ref 4–5.2)
RBC # UR STRIP.AUTO: NEGATIVE MG/DL
RBC #/AREA URNS AUTO: ABNORMAL /HPF
SAO2 % BLDV: 38 % (ref 45–75)
SAO2 % BLDV: 38 % (ref 45–75)
SODIUM BLDV-SCNC: 133 MMOL/L (ref 136–145)
SODIUM BLDV-SCNC: 142 MMOL/L (ref 136–145)
SODIUM SERPL-SCNC: 138 MMOL/L (ref 136–145)
SP GR UR STRIP.AUTO: 1.02
SQUAMOUS #/AREA URNS AUTO: ABNORMAL /HPF
T AXIS: 66 DEGREES
T AXIS: 81 DEGREES
T OFFSET: 407 MS
T OFFSET: 411 MS
TSH SERPL-ACNC: 3.8 MIU/L (ref 0.44–3.98)
UROBILINOGEN UR STRIP.AUTO-MCNC: NORMAL MG/DL
VENTRICULAR RATE: 88 BPM
VENTRICULAR RATE: 90 BPM
WBC # BLD AUTO: 14.9 X10*3/UL (ref 4.4–11.3)
WBC #/AREA URNS AUTO: ABNORMAL /HPF
WBC CLUMPS #/AREA URNS AUTO: ABNORMAL /HPF

## 2025-08-01 PROCEDURE — 93005 ELECTROCARDIOGRAM TRACING: CPT

## 2025-08-01 PROCEDURE — 87086 URINE CULTURE/COLONY COUNT: CPT | Mod: ELYLAB | Performed by: STUDENT IN AN ORGANIZED HEALTH CARE EDUCATION/TRAINING PROGRAM

## 2025-08-01 PROCEDURE — 2500000001 HC RX 250 WO HCPCS SELF ADMINISTERED DRUGS (ALT 637 FOR MEDICARE OP): Performed by: STUDENT IN AN ORGANIZED HEALTH CARE EDUCATION/TRAINING PROGRAM

## 2025-08-01 PROCEDURE — 71045 X-RAY EXAM CHEST 1 VIEW: CPT

## 2025-08-01 PROCEDURE — 96365 THER/PROPH/DIAG IV INF INIT: CPT | Mod: 59

## 2025-08-01 PROCEDURE — 84484 ASSAY OF TROPONIN QUANT: CPT | Performed by: STUDENT IN AN ORGANIZED HEALTH CARE EDUCATION/TRAINING PROGRAM

## 2025-08-01 PROCEDURE — 71045 X-RAY EXAM CHEST 1 VIEW: CPT | Performed by: STUDENT IN AN ORGANIZED HEALTH CARE EDUCATION/TRAINING PROGRAM

## 2025-08-01 PROCEDURE — 82947 ASSAY GLUCOSE BLOOD QUANT: CPT

## 2025-08-01 PROCEDURE — 85025 COMPLETE CBC W/AUTO DIFF WBC: CPT | Performed by: STUDENT IN AN ORGANIZED HEALTH CARE EDUCATION/TRAINING PROGRAM

## 2025-08-01 PROCEDURE — 84132 ASSAY OF SERUM POTASSIUM: CPT | Performed by: STUDENT IN AN ORGANIZED HEALTH CARE EDUCATION/TRAINING PROGRAM

## 2025-08-01 PROCEDURE — 99223 1ST HOSP IP/OBS HIGH 75: CPT | Performed by: REGISTERED NURSE

## 2025-08-01 PROCEDURE — 2500000004 HC RX 250 GENERAL PHARMACY W/ HCPCS (ALT 636 FOR OP/ED): Performed by: STUDENT IN AN ORGANIZED HEALTH CARE EDUCATION/TRAINING PROGRAM

## 2025-08-01 PROCEDURE — 87506 IADNA-DNA/RNA PROBE TQ 6-11: CPT | Mod: ELYLAB | Performed by: REGISTERED NURSE

## 2025-08-01 PROCEDURE — 99285 EMERGENCY DEPT VISIT HI MDM: CPT | Mod: 25 | Performed by: STUDENT IN AN ORGANIZED HEALTH CARE EDUCATION/TRAINING PROGRAM

## 2025-08-01 PROCEDURE — 83735 ASSAY OF MAGNESIUM: CPT | Performed by: STUDENT IN AN ORGANIZED HEALTH CARE EDUCATION/TRAINING PROGRAM

## 2025-08-01 PROCEDURE — 36415 COLL VENOUS BLD VENIPUNCTURE: CPT | Performed by: STUDENT IN AN ORGANIZED HEALTH CARE EDUCATION/TRAINING PROGRAM

## 2025-08-01 PROCEDURE — 96375 TX/PRO/DX INJ NEW DRUG ADDON: CPT | Mod: 59

## 2025-08-01 PROCEDURE — 96366 THER/PROPH/DIAG IV INF ADDON: CPT | Mod: 59

## 2025-08-01 PROCEDURE — 2500000002 HC RX 250 W HCPCS SELF ADMINISTERED DRUGS (ALT 637 FOR MEDICARE OP, ALT 636 FOR OP/ED): Performed by: STUDENT IN AN ORGANIZED HEALTH CARE EDUCATION/TRAINING PROGRAM

## 2025-08-01 PROCEDURE — 96361 HYDRATE IV INFUSION ADD-ON: CPT | Mod: 59

## 2025-08-01 PROCEDURE — 83993 ASSAY FOR CALPROTECTIN FECAL: CPT | Performed by: REGISTERED NURSE

## 2025-08-01 PROCEDURE — 81001 URINALYSIS AUTO W/SCOPE: CPT | Performed by: STUDENT IN AN ORGANIZED HEALTH CARE EDUCATION/TRAINING PROGRAM

## 2025-08-01 PROCEDURE — 84100 ASSAY OF PHOSPHORUS: CPT | Performed by: STUDENT IN AN ORGANIZED HEALTH CARE EDUCATION/TRAINING PROGRAM

## 2025-08-01 PROCEDURE — G0378 HOSPITAL OBSERVATION PER HR: HCPCS

## 2025-08-01 PROCEDURE — 87493 C DIFF AMPLIFIED PROBE: CPT | Performed by: REGISTERED NURSE

## 2025-08-01 PROCEDURE — 84443 ASSAY THYROID STIM HORMONE: CPT | Performed by: STUDENT IN AN ORGANIZED HEALTH CARE EDUCATION/TRAINING PROGRAM

## 2025-08-01 PROCEDURE — 2500000004 HC RX 250 GENERAL PHARMACY W/ HCPCS (ALT 636 FOR OP/ED): Performed by: REGISTERED NURSE

## 2025-08-01 RX ORDER — TRAMADOL HYDROCHLORIDE 50 MG/1
50 TABLET, FILM COATED ORAL EVERY 6 HOURS PRN
COMMUNITY
Start: 2025-01-31

## 2025-08-01 RX ORDER — ONDANSETRON 4 MG/1
4 TABLET, FILM COATED ORAL EVERY 8 HOURS PRN
Status: DISCONTINUED | OUTPATIENT
Start: 2025-08-01 | End: 2025-08-02 | Stop reason: HOSPADM

## 2025-08-01 RX ORDER — ACETAMINOPHEN 160 MG/5ML
650 SOLUTION ORAL EVERY 4 HOURS PRN
Status: DISCONTINUED | OUTPATIENT
Start: 2025-08-01 | End: 2025-08-02 | Stop reason: HOSPADM

## 2025-08-01 RX ORDER — ACETAMINOPHEN 650 MG/1
650 SUPPOSITORY RECTAL EVERY 4 HOURS PRN
Status: DISCONTINUED | OUTPATIENT
Start: 2025-08-01 | End: 2025-08-02 | Stop reason: HOSPADM

## 2025-08-01 RX ORDER — ACETAMINOPHEN 325 MG/1
650 TABLET ORAL EVERY 4 HOURS PRN
Status: DISCONTINUED | OUTPATIENT
Start: 2025-08-01 | End: 2025-08-02 | Stop reason: HOSPADM

## 2025-08-01 RX ORDER — ONDANSETRON HYDROCHLORIDE 2 MG/ML
4 INJECTION, SOLUTION INTRAVENOUS EVERY 8 HOURS PRN
Status: DISCONTINUED | OUTPATIENT
Start: 2025-08-01 | End: 2025-08-02 | Stop reason: HOSPADM

## 2025-08-01 RX ORDER — INSULIN LISPRO 100 [IU]/ML
0-10 INJECTION, SOLUTION INTRAVENOUS; SUBCUTANEOUS
Status: DISCONTINUED | OUTPATIENT
Start: 2025-08-02 | End: 2025-08-02 | Stop reason: HOSPADM

## 2025-08-01 RX ORDER — ENOXAPARIN SODIUM 100 MG/ML
40 INJECTION SUBCUTANEOUS EVERY 24 HOURS
Status: DISCONTINUED | OUTPATIENT
Start: 2025-08-01 | End: 2025-08-02 | Stop reason: HOSPADM

## 2025-08-01 RX ORDER — LOPERAMIDE HYDROCHLORIDE 2 MG/1
4 CAPSULE ORAL 3 TIMES DAILY PRN
COMMUNITY
Start: 2025-07-31

## 2025-08-01 RX ORDER — SODIUM CHLORIDE, SODIUM LACTATE, POTASSIUM CHLORIDE, CALCIUM CHLORIDE 600; 310; 30; 20 MG/100ML; MG/100ML; MG/100ML; MG/100ML
100 INJECTION, SOLUTION INTRAVENOUS CONTINUOUS
Status: DISCONTINUED | OUTPATIENT
Start: 2025-08-01 | End: 2025-08-02

## 2025-08-01 RX ORDER — MAGNESIUM SULFATE HEPTAHYDRATE 40 MG/ML
2 INJECTION, SOLUTION INTRAVENOUS ONCE
Status: COMPLETED | OUTPATIENT
Start: 2025-08-01 | End: 2025-08-01

## 2025-08-01 RX ORDER — POTASSIUM CHLORIDE 1.5 G/1.58G
40 POWDER, FOR SOLUTION ORAL ONCE
Status: COMPLETED | OUTPATIENT
Start: 2025-08-01 | End: 2025-08-01

## 2025-08-01 RX ORDER — TALC
3 POWDER (GRAM) TOPICAL NIGHTLY PRN
Status: DISCONTINUED | OUTPATIENT
Start: 2025-08-01 | End: 2025-08-02 | Stop reason: HOSPADM

## 2025-08-01 RX ADMIN — MAGNESIUM SULFATE HEPTAHYDRATE 2 G: 40 INJECTION, SOLUTION INTRAVENOUS at 15:28

## 2025-08-01 RX ADMIN — SODIUM CHLORIDE, SODIUM LACTATE, POTASSIUM CHLORIDE, AND CALCIUM CHLORIDE 1000 ML: .6; .31; .03; .02 INJECTION, SOLUTION INTRAVENOUS at 15:28

## 2025-08-01 RX ADMIN — POTASSIUM CHLORIDE 40 MEQ: 1.5 POWDER, FOR SOLUTION ORAL at 17:32

## 2025-08-01 RX ADMIN — SODIUM CHLORIDE, SODIUM LACTATE, POTASSIUM CHLORIDE, AND CALCIUM CHLORIDE 100 ML/HR: .6; .31; .03; .02 INJECTION, SOLUTION INTRAVENOUS at 21:17

## 2025-08-01 RX ADMIN — ONDANSETRON 4 MG: 2 INJECTION, SOLUTION INTRAMUSCULAR; INTRAVENOUS at 22:19

## 2025-08-01 RX ADMIN — SODIUM CHLORIDE, SODIUM LACTATE, POTASSIUM CHLORIDE, AND CALCIUM CHLORIDE 1000 ML: .6; .31; .03; .02 INJECTION, SOLUTION INTRAVENOUS at 14:22

## 2025-08-01 RX ADMIN — INSULIN HUMAN 5 UNITS: 100 INJECTION, SOLUTION PARENTERAL at 17:32

## 2025-08-01 SDOH — ECONOMIC STABILITY: FOOD INSECURITY: HOW HARD IS IT FOR YOU TO PAY FOR THE VERY BASICS LIKE FOOD, HOUSING, MEDICAL CARE, AND HEATING?: NOT VERY HARD

## 2025-08-01 SDOH — ECONOMIC STABILITY: HOUSING INSECURITY: IN THE PAST 12 MONTHS, HOW MANY TIMES HAVE YOU MOVED WHERE YOU WERE LIVING?: 0

## 2025-08-01 SDOH — ECONOMIC STABILITY: HOUSING INSECURITY: IN THE LAST 12 MONTHS, WAS THERE A TIME WHEN YOU WERE NOT ABLE TO PAY THE MORTGAGE OR RENT ON TIME?: NO

## 2025-08-01 SDOH — SOCIAL STABILITY: SOCIAL INSECURITY: DO YOU FEEL ANYONE HAS EXPLOITED OR TAKEN ADVANTAGE OF YOU FINANCIALLY OR OF YOUR PERSONAL PROPERTY?: NO

## 2025-08-01 SDOH — SOCIAL STABILITY: SOCIAL INSECURITY: ARE THERE ANY APPARENT SIGNS OF INJURIES/BEHAVIORS THAT COULD BE RELATED TO ABUSE/NEGLECT?: NO

## 2025-08-01 SDOH — ECONOMIC STABILITY: FOOD INSECURITY: WITHIN THE PAST 12 MONTHS, YOU WORRIED THAT YOUR FOOD WOULD RUN OUT BEFORE YOU GOT THE MONEY TO BUY MORE.: NEVER TRUE

## 2025-08-01 SDOH — SOCIAL STABILITY: SOCIAL INSECURITY: WERE YOU ABLE TO COMPLETE ALL THE BEHAVIORAL HEALTH SCREENINGS?: YES

## 2025-08-01 SDOH — SOCIAL STABILITY: SOCIAL INSECURITY: WITHIN THE LAST YEAR, HAVE YOU BEEN HUMILIATED OR EMOTIONALLY ABUSED IN OTHER WAYS BY YOUR PARTNER OR EX-PARTNER?: NO

## 2025-08-01 SDOH — SOCIAL STABILITY: SOCIAL INSECURITY: ABUSE: ADULT

## 2025-08-01 SDOH — SOCIAL STABILITY: SOCIAL INSECURITY: ARE YOU MARRIED, WIDOWED, DIVORCED, SEPARATED, NEVER MARRIED, OR LIVING WITH A PARTNER?: PATIENT DECLINED

## 2025-08-01 SDOH — SOCIAL STABILITY: SOCIAL INSECURITY: WITHIN THE LAST YEAR, HAVE YOU BEEN AFRAID OF YOUR PARTNER OR EX-PARTNER?: NO

## 2025-08-01 SDOH — SOCIAL STABILITY: SOCIAL NETWORK: IN A TYPICAL WEEK, HOW MANY TIMES DO YOU TALK ON THE PHONE WITH FAMILY, FRIENDS, OR NEIGHBORS?: THREE TIMES A WEEK

## 2025-08-01 SDOH — SOCIAL STABILITY: SOCIAL NETWORK: HOW OFTEN DO YOU ATTEND CHURCH OR RELIGIOUS SERVICES?: PATIENT DECLINED

## 2025-08-01 SDOH — SOCIAL STABILITY: SOCIAL NETWORK
DO YOU BELONG TO ANY CLUBS OR ORGANIZATIONS SUCH AS CHURCH GROUPS, UNIONS, FRATERNAL OR ATHLETIC GROUPS, OR SCHOOL GROUPS?: PATIENT DECLINED

## 2025-08-01 SDOH — ECONOMIC STABILITY: INCOME INSECURITY: IN THE PAST 12 MONTHS HAS THE ELECTRIC, GAS, OIL, OR WATER COMPANY THREATENED TO SHUT OFF SERVICES IN YOUR HOME?: NO

## 2025-08-01 SDOH — SOCIAL STABILITY: SOCIAL INSECURITY: HAVE YOU HAD THOUGHTS OF HARMING ANYONE ELSE?: NO

## 2025-08-01 SDOH — HEALTH STABILITY: PHYSICAL HEALTH
HOW OFTEN DO YOU NEED TO HAVE SOMEONE HELP YOU WHEN YOU READ INSTRUCTIONS, PAMPHLETS, OR OTHER WRITTEN MATERIAL FROM YOUR DOCTOR OR PHARMACY?: NEVER

## 2025-08-01 SDOH — ECONOMIC STABILITY: FOOD INSECURITY: WITHIN THE PAST 12 MONTHS, THE FOOD YOU BOUGHT JUST DIDN'T LAST AND YOU DIDN'T HAVE MONEY TO GET MORE.: NEVER TRUE

## 2025-08-01 SDOH — HEALTH STABILITY: MENTAL HEALTH
DO YOU FEEL STRESS - TENSE, RESTLESS, NERVOUS, OR ANXIOUS, OR UNABLE TO SLEEP AT NIGHT BECAUSE YOUR MIND IS TROUBLED ALL THE TIME - THESE DAYS?: NOT AT ALL

## 2025-08-01 SDOH — ECONOMIC STABILITY: TRANSPORTATION INSECURITY: IN THE PAST 12 MONTHS, HAS LACK OF TRANSPORTATION KEPT YOU FROM MEDICAL APPOINTMENTS OR FROM GETTING MEDICATIONS?: NO

## 2025-08-01 SDOH — SOCIAL STABILITY: SOCIAL INSECURITY
WITHIN THE LAST YEAR, HAVE YOU BEEN RAPED OR FORCED TO HAVE ANY KIND OF SEXUAL ACTIVITY BY YOUR PARTNER OR EX-PARTNER?: NO

## 2025-08-01 SDOH — ECONOMIC STABILITY: HOUSING INSECURITY: AT ANY TIME IN THE PAST 12 MONTHS, WERE YOU HOMELESS OR LIVING IN A SHELTER (INCLUDING NOW)?: NO

## 2025-08-01 SDOH — SOCIAL STABILITY: SOCIAL NETWORK: HOW OFTEN DO YOU GET TOGETHER WITH FRIENDS OR RELATIVES?: THREE TIMES A WEEK

## 2025-08-01 SDOH — HEALTH STABILITY: PHYSICAL HEALTH: ON AVERAGE, HOW MANY DAYS PER WEEK DO YOU ENGAGE IN MODERATE TO STRENUOUS EXERCISE (LIKE A BRISK WALK)?: 0 DAYS

## 2025-08-01 SDOH — SOCIAL STABILITY: SOCIAL INSECURITY
WITHIN THE LAST YEAR, HAVE YOU BEEN KICKED, HIT, SLAPPED, OR OTHERWISE PHYSICALLY HURT BY YOUR PARTNER OR EX-PARTNER?: NO

## 2025-08-01 SDOH — HEALTH STABILITY: PHYSICAL HEALTH: ON AVERAGE, HOW MANY MINUTES DO YOU ENGAGE IN EXERCISE AT THIS LEVEL?: 0 MIN

## 2025-08-01 SDOH — SOCIAL STABILITY: SOCIAL INSECURITY: HAS ANYONE EVER THREATENED TO HURT YOUR FAMILY OR YOUR PETS?: NO

## 2025-08-01 SDOH — SOCIAL STABILITY: SOCIAL NETWORK: HOW OFTEN DO YOU ATTEND MEETINGS OF THE CLUBS OR ORGANIZATIONS YOU BELONG TO?: PATIENT DECLINED

## 2025-08-01 SDOH — SOCIAL STABILITY: SOCIAL INSECURITY: HAVE YOU HAD ANY THOUGHTS OF HARMING ANYONE ELSE?: NO

## 2025-08-01 SDOH — SOCIAL STABILITY: SOCIAL INSECURITY: DO YOU FEEL UNSAFE GOING BACK TO THE PLACE WHERE YOU ARE LIVING?: NO

## 2025-08-01 SDOH — SOCIAL STABILITY: SOCIAL INSECURITY: DOES ANYONE TRY TO KEEP YOU FROM HAVING/CONTACTING OTHER FRIENDS OR DOING THINGS OUTSIDE YOUR HOME?: NO

## 2025-08-01 SDOH — SOCIAL STABILITY: SOCIAL INSECURITY: ARE YOU OR HAVE YOU BEEN THREATENED OR ABUSED PHYSICALLY, EMOTIONALLY, OR SEXUALLY BY ANYONE?: NO

## 2025-08-01 ASSESSMENT — COGNITIVE AND FUNCTIONAL STATUS - GENERAL
MOBILITY SCORE: 24
DAILY ACTIVITIY SCORE: 24
PATIENT BASELINE BEDBOUND: NO

## 2025-08-01 ASSESSMENT — LIFESTYLE VARIABLES
TOTAL SCORE: 0
EVER FELT BAD OR GUILTY ABOUT YOUR DRINKING: NO
HOW OFTEN DO YOU HAVE 6 OR MORE DRINKS ON ONE OCCASION: NEVER
HAVE YOU EVER FELT YOU SHOULD CUT DOWN ON YOUR DRINKING: NO
AUDIT-C TOTAL SCORE: 0
EVER HAD A DRINK FIRST THING IN THE MORNING TO STEADY YOUR NERVES TO GET RID OF A HANGOVER: NO
SKIP TO QUESTIONS 9-10: 1
SUBSTANCE_ABUSE_PAST_12_MONTHS: NO
AUDIT-C TOTAL SCORE: 0
HOW MANY STANDARD DRINKS CONTAINING ALCOHOL DO YOU HAVE ON A TYPICAL DAY: PATIENT DOES NOT DRINK
PRESCIPTION_ABUSE_PAST_12_MONTHS: NO
HOW OFTEN DO YOU HAVE A DRINK CONTAINING ALCOHOL: NEVER
HAVE PEOPLE ANNOYED YOU BY CRITICIZING YOUR DRINKING: NO

## 2025-08-01 ASSESSMENT — ACTIVITIES OF DAILY LIVING (ADL)
BATHING: INDEPENDENT
HEARING - RIGHT EAR: FUNCTIONAL
GROOMING: INDEPENDENT
TOILETING: INDEPENDENT
HEARING - LEFT EAR: FUNCTIONAL
LACK_OF_TRANSPORTATION: NO
WALKS IN HOME: INDEPENDENT
FEEDING YOURSELF: INDEPENDENT
JUDGMENT_ADEQUATE_SAFELY_COMPLETE_DAILY_ACTIVITIES: YES
PATIENT'S MEMORY ADEQUATE TO SAFELY COMPLETE DAILY ACTIVITIES?: YES
ADEQUATE_TO_COMPLETE_ADL: YES
LACK_OF_TRANSPORTATION: NO
DRESSING YOURSELF: INDEPENDENT

## 2025-08-01 ASSESSMENT — PAIN - FUNCTIONAL ASSESSMENT
PAIN_FUNCTIONAL_ASSESSMENT: 0-10
PAIN_FUNCTIONAL_ASSESSMENT: 0-10

## 2025-08-01 ASSESSMENT — PAIN SCALES - GENERAL
PAINLEVEL_OUTOF10: 0 - NO PAIN
PAINLEVEL_OUTOF10: 0 - NO PAIN

## 2025-08-01 ASSESSMENT — PATIENT HEALTH QUESTIONNAIRE - PHQ9
SUM OF ALL RESPONSES TO PHQ9 QUESTIONS 1 & 2: 0
2. FEELING DOWN, DEPRESSED OR HOPELESS: NOT AT ALL
1. LITTLE INTEREST OR PLEASURE IN DOING THINGS: NOT AT ALL

## 2025-08-01 NOTE — ED PROVIDER NOTES
"HPI   Chief Complaint   Patient presents with   • Weakness, Gen       Patient is a 64-year-old female with history of type 2 diabetes who presents for generalized weakness.  Patient states that she was admitted to the hospital recently for about a month of diarrhea.  No black or bloody stools.  States he did have vomiting today for about an hour.  States he was admitted to an outside hospital was diagnosed with \"metabolic acidosis\" and discharged home.  No chest pain, shortness of breath, fevers, chills, cough, runny nose, abdominal pain.  Denies dysuria, hematuria, polyuria.  States she generally feels weak.  States she took her blood pressure at home today and it was 60s and 70s systolic.  EMS was called.  Initial blood pressure for them was 120 systolic, repeat was in the 90s.  No history of DVT, PE, MI, CVA, cardiac stents.  Patient is on metformin and insulin.  She was off of the metformin for about a week while in the hospital and took 1 dose this morning.  States she recently stopped taking Mounjaro, last dose was about 2 weeks ago.  States they went up on her dose just prior to her diarrhea starting.  No tobacco, alcohol, drugs.              Patient History   Medical History[1]  Surgical History[2]  Family History[3]  Social History[4]    Physical Exam   ED Triage Vitals   Temp Pulse Resp BP   -- -- -- --      SpO2 Temp src Heart Rate Source Patient Position   -- -- -- --      BP Location FiO2 (%)     -- --       Physical Exam  Constitutional:       General: She is not in acute distress.  HENT:      Head: Normocephalic.     Eyes:      Extraocular Movements: Extraocular movements intact.      Conjunctiva/sclera: Conjunctivae normal.      Pupils: Pupils are equal, round, and reactive to light.       Cardiovascular:      Rate and Rhythm: Normal rate and regular rhythm.      Pulses: Normal pulses.      Heart sounds: Normal heart sounds.   Pulmonary:      Effort: Pulmonary effort is normal.      Breath sounds: " Normal breath sounds.   Abdominal:      General: There is no distension.      Palpations: Abdomen is soft. There is no mass.      Tenderness: There is no abdominal tenderness. There is no guarding.     Musculoskeletal:         General: No deformity.      Cervical back: Normal range of motion and neck supple.      Right lower leg: No edema.      Left lower leg: No edema.     Skin:     General: Skin is warm and dry.      Findings: No lesion or rash.     Neurological:      General: No focal deficit present.      Mental Status: She is alert and oriented to person, place, and time. Mental status is at baseline.      Cranial Nerves: No cranial nerve deficit.      Sensory: No sensory deficit.      Motor: No weakness.     Psychiatric:         Mood and Affect: Mood normal.           ED Course & MDM   ED Course as of 08/02/25 1040   Fri Aug 01, 2025   1711 Case is discussed with the hospitalist service and the patient is admitted to Dr. Catalan.  Patient and family understand and agree with hospitalization plan. [PS]      ED Course User Index  [PS] Jordan Boo,          Diagnoses as of 08/02/25 1040   Generalized weakness   Hypokalemia   Hyperglycemia   Hypomagnesemia                 No data recorded     [place] Coma Scale Score: 15 (08/01/25 1341 : Diya Mcgraw, [place])                           Medical Decision Making  EKG on my interpretation: Rate 80, rhythm regular, axis normal, , QRS 78, QTc 450, T waves: Unremarkable, ST segments: No elevations or depressions, interpretation: Normal sinus rhythm, no STEMI    EKG on my interpretation: Rate 90, rhythm regular, axis rightward, , QRS 70, QTc 459, T waves: Unremarkable, ST segments: No elevations or depressions, interpretation: Normal sinus rhythm, no STEMI    Patient is a 64-year-old female with above-stated past medical history who presents for generalized weakness.  Patient's EKGs are nonischemic, troponins are negative x 2, low suspicion for ACS.   Patient's CMP shows a mild hypokalemia, this was repleted Emergency Department.  She does have a hyperglycemia, though she has no anion gap elevation to suggest DKA and she is clinically well-appearing, low suspicion for this.  She was given 5 units of IV insulin after fluid rehydration.  Patient's magnesium was 1.5, this was repleted in the emergency department.  Patient patient is of a mild leukocytosis, though have low suspicion for sepsis at this time.  Patient's blood gas initially was contaminated, therefore repeat sample was drawn and is fortunately unremarkable.  Urinalysis shows 1+ bacteria and some white cells, however she has no symptoms of a UTI and given she already has diarrhea I am concerned that antibiotics at this time might worsen her diarrhea, therefore if those were deferred.  Patient's TSH is within normal limits, not consistent with thyroid pathology.  Patient is moving all extremities equally with no focal deficits, I have low suspicion for acute intracranial process.  Chest x-ray did not show signs of pneumonia or pneumothorax on my review, this is confirmed by radiology.  I have low suspicion for cardiac tamponade, pulmonary [place], dissection.  I suspect patient's symptoms are most likely due to her recent diarrhea as well as her vomiting this morning.  She may be suffering from a gastroenteritis.  Given patient's hypotension at home, hypokalemia, hyper glycemia, I believe she would benefit from admission and further management.  Patient was amenable to the plan.  Her case was discussed with the medicine service who accepted the patient for admission.    Disclaimer: This note was dictated using speech recognition software. Minor errors in transcription may be present. Please call if questions.     Trenton Nicole MD  [facility]  Contact on [employer]        Problems Addressed:  Generalized weakness: acute illness or injury  Hyperglycemia: acute illness or injury  Hypokalemia:  acute illness or injury  Hypomagnesemia: acute illness or injury    Amount and/or Complexity of Data Reviewed  Labs: ordered.  Radiology: ordered and independent interpretation performed.  ECG/medicine tests: ordered and independent interpretation performed.        Procedure  Procedures         [1]  Past Medical History:  Diagnosis Date   • Diabetes mellitus (Multi)    • Disease of thyroid gland    • Hypertension    [2]  Past Surgical History:  Procedure Laterality Date   •  SECTION, LOW TRANSVERSE     • JOINT REPLACEMENT  left knee   [3]  Family History  Problem Relation Name Age of Onset   • Cancer Sister Leeann Arambula    • Diabetes Mother Gabriel Gerber    • Hypertension Mother Gabriel Gerber    • Heart disease Father Austyn Gerber    [4]  Social History  Tobacco Use   • Smoking status: Former     Types: Cigarettes   • Smokeless tobacco: Never   Vaping Use   • Vaping status: Never Used   Substance Use Topics   • Alcohol use: Yes     Comment: Socially   • Drug use: Not Currently      Trenton Nicole MD  [phone]

## 2025-08-01 NOTE — H&P
History Of Present Illness  Alina Chavarria is a 64 y.o. female PMHx of HTN,  DUNCAN, DM 2 on insulin, hypothyroidism presents after being discharged from [facility] for 7 days with near syncope, hypotension and complaint of diarrhea and vomiting. She stated she just got home last evening from [facility], and was eating toast, took her metformin and advil and started vomiting bile for an hour. She was near syncope per family and they took her BP and her SBP was 70s. She denies abdominal pain, dark tarry stools, or coffee ground emesis. They called 911 and came to the ER. She had a colonoscopy at [facility] and per family biopsies are pending.     Patient examined and seen. Alert and oriented x3, resting comfortably.  Patient denies chest pain, shortness of breath, palpitations, abdominal pain, fever or chills.  She states between last night and today she has had 4-5 loose bowel movements. Denies dark or tarry stools. She is not on any anticoagulations.     ER Course: CXR negative , glucose 328, sodium 138, potassium 3.4, chloride 2, bicarb 20, creatinine 1.01, ALT 54, AST 22, magnesium 1.5, troponins 4 and 3, TSH 3.8, WBCs 14.9, hemoglobin 14.2, hematocrit 44, venous ABG 7.35, pCO2 40, PaO2 24, urinalysis leukocytes 25, WBCs 11-20, bacteria 1, patient denies any dysuria or urinary frequency at this time.    Review of systems: 10 system were reviewed and were negative except what was mentioned in history of present illness       Past Medical History  Medical History[1]    Surgical History  Surgical History[2]     Social History  She reports that she has quit smoking. Her smoking use included cigarettes. She has never used smokeless tobacco. She reports current alcohol use. She reports that she does not currently use drugs.    Family History  Family History[3]     Allergies  Patient has no known allergies.         Physical Exam   Constitutional: appears acutely ill, awake/alert/oriented x3, , cooperative  Eyes: PERRL, EOMI,  "clear sclera  ENMT: mucous membranes moist, no apparent injury, no lesions seen  Head/Neck: Neck supple, no apparent injury, thyroid without mass or tenderness  Respiratory/Thorax: Patent airways,  normal breath sounds with good chest expansion, thorax symmetric  Cardiovascular: Regular, rate and rhythm, no murmurs, 2+ equal pulses of the extremities, normal S 1and S 2  Gastrointestinal: Nondistended, soft, non-tender, +BS x 4 quadrants  Genitourinary: voiding freely without CVA tenderness or suprabupic tenderness   Musculoskeletal: ROM intact, no joint swelling,   Extremities: normal extremities,  no contusions or wounds seen   Skin: warm, dry, intact  Neurological: alert/oriented x 3, speech clear,   Psychiatric: appropriate mood and behavior    Last Recorded Vitals  Blood pressure 106/60, pulse 84, temperature 36.4 °C (97.5 °F), temperature source Temporal, resp. rate 17, height 1.651 m (5' 5\"), weight 81.6 kg (180 lb), SpO2 95%.    Relevant Results  Scheduled medications  Scheduled Medications[4]  Continuous medications  Continuous Medications[5]  PRN medications  PRN Medications[6]    Results for orders placed or performed during the hospital encounter of 08/01/25 (from the past 24 hours)   CBC and Auto Differential   Result Value Ref Range    WBC 14.9 (H) 4.4 - 11.3 x10*3/uL    nRBC 0.0 0.0 - 0.0 /100 WBCs    RBC 4.88 4.00 - 5.20 x10*6/uL    Hemoglobin 14.2 12.0 - 16.0 g/dL    Hematocrit 44.0 36.0 - 46.0 %    MCV 90 80 - 100 fL    MCH 29.1 26.0 - 34.0 pg    MCHC 32.3 32.0 - 36.0 g/dL    RDW 13.2 11.5 - 14.5 %    Platelets 444 150 - 450 x10*3/uL    Neutrophils % 91.5 40.0 - 80.0 %    Immature Granulocytes %, Automated 0.3 0.0 - 0.9 %    Lymphocytes % 3.4 13.0 - 44.0 %    Monocytes % 4.1 2.0 - 10.0 %    Eosinophils % 0.5 0.0 - 6.0 %    Basophils % 0.2 0.0 - 2.0 %    Neutrophils Absolute 13.59 (H) 1.20 - 7.70 x10*3/uL    Immature Granulocytes Absolute, Automated 0.05 0.00 - 0.70 x10*3/uL    Lymphocytes Absolute " 0.51 (L) 1.20 - 4.80 x10*3/uL    Monocytes Absolute 0.61 0.10 - 1.00 x10*3/uL    Eosinophils Absolute 0.08 0.00 - 0.70 x10*3/uL    Basophils Absolute 0.03 0.00 - 0.10 x10*3/uL   Comprehensive Metabolic Panel   Result Value Ref Range    Glucose 328 (H) 74 - 99 mg/dL    Sodium 138 136 - 145 mmol/L    Potassium 3.4 (L) 3.5 - 5.3 mmol/L    Chloride 102 98 - 107 mmol/L    Bicarbonate 20 (L) 21 - 32 mmol/L    Anion Gap 19 10 - 20 mmol/L    Urea Nitrogen 12 6 - 23 mg/dL    Creatinine 1.01 0.50 - 1.05 mg/dL    eGFR 62 >60 mL/min/1.73m*2    Calcium 8.9 8.6 - 10.3 mg/dL    Albumin 3.9 3.4 - 5.0 g/dL    Alkaline Phosphatase 114 33 - 136 U/L    Total Protein 6.7 6.4 - 8.2 g/dL    AST 22 9 - 39 U/L    Bilirubin, Total 0.9 0.0 - 1.2 mg/dL    ALT 54 (H) 7 - 45 U/L   BLOOD GAS VENOUS FULL PANEL   Result Value Ref Range    POCT pH, Venous 7.31 (L) 7.33 - 7.43 pH    POCT pCO2, Venous 22 (L) 41 - 51 mm Hg    POCT pO2, Venous 24 (L) 35 - 45 mm Hg    POCT SO2, Venous 38 (L) 45 - 75 %    POCT Oxy Hemoglobin, Venous 37.2 (L) 45.0 - 75.0 %    POCT Hematocrit Calculated, Venous 24.0 (L) 36.0 - 46.0 %    POCT Sodium, Venous 142 136 - 145 mmol/L    POCT Potassium, Venous 1.5 (LL) 3.5 - 5.3 mmol/L    POCT Chloride, Venous 122 (H) 98 - 107 mmol/L    POCT Ionized Calicum, Venous 0.69 (LL) 1.10 - 1.33 mmol/L    POCT Glucose, Venous 198 (H) 74 - 99 mg/dL    POCT Lactate, Venous 1.6 0.4 - 2.0 mmol/L    POCT Base Excess, Venous -13.7 (L) -2.0 - 3.0 mmol/L    POCT HCO3 Calculated, Venous 11.1 (L) 22.0 - 26.0 mmol/L    POCT Hemoglobin, Venous 8.0 (L) 12.0 - 16.0 g/dL    POCT Anion Gap, Venous 10.0 10.0 - 25.0 mmol/L    Patient Temperature      FiO2 21 %    Critical Called By SONYA WISEMAN     Critical Called To DR. ALVARENGA     Critical Call Time 1442     Critical Read Back Y    Troponin I, High Sensitivity, Initial   Result Value Ref Range    Troponin I, High Sensitivity 4 0 - 13 ng/L   TSH with reflex to Free T4 if abnormal   Result Value Ref Range     Thyroid Stimulating Hormone 3.80 0.44 - 3.98 mIU/L   Light Blue Top   Result Value Ref Range    Extra Tube Hold for add-ons.    Lavender Top   Result Value Ref Range    Extra Tube Hold for add-ons.    Gray Top   Result Value Ref Range    Extra Tube Hold for add-ons.    Magnesium   Result Value Ref Range    Magnesium 1.50 (L) 1.60 - 2.40 mg/dL   Phosphorus   Result Value Ref Range    Phosphorus 3.0 2.5 - 4.9 mg/dL   ECG 12 lead   Result Value Ref Range    Ventricular Rate 88 BPM    Atrial Rate 88 BPM    UT Interval 156 ms    QRS Duration 78 ms    QT Interval 372 ms    QTC Calculation(Bazett) 450 ms    P Axis 75 degrees    R Axis 86 degrees    T Axis 66 degrees    QRS Count 15 beats    Q Onset 221 ms    P Onset 143 ms    P Offset 197 ms    T Offset 407 ms    QTC Fredericia 422 ms   BLOOD GAS VENOUS FULL PANEL   Result Value Ref Range    POCT pH, Venous 7.35 7.33 - 7.43 pH    POCT pCO2, Venous 40 (L) 41 - 51 mm Hg    POCT pO2, Venous 24 (L) 35 - 45 mm Hg    POCT SO2, Venous 38 (L) 45 - 75 %    POCT Oxy Hemoglobin, Venous 37.2 (L) 45.0 - 75.0 %    POCT Hematocrit Calculated, Venous 38.0 36.0 - 46.0 %    POCT Sodium, Venous 133 (L) 136 - 145 mmol/L    POCT Potassium, Venous 4.0 3.5 - 5.3 mmol/L    POCT Chloride, Venous 101 98 - 107 mmol/L    POCT Ionized Calicum, Venous 1.17 1.10 - 1.33 mmol/L    POCT Glucose, Venous 388 (H) 74 - 99 mg/dL    POCT Lactate, Venous 2.2 (H) 0.4 - 2.0 mmol/L    POCT Base Excess, Venous -3.3 (L) -2.0 - 3.0 mmol/L    POCT HCO3 Calculated, Venous 22.1 22.0 - 26.0 mmol/L    POCT Hemoglobin, Venous 12.6 12.0 - 16.0 g/dL    POCT Anion Gap, Venous 14.0 10.0 - 25.0 mmol/L    Patient Temperature      FiO2 21 %   ECG 12 lead   Result Value Ref Range    Ventricular Rate 90 BPM    Atrial Rate 90 BPM    UT Interval 160 ms    QRS Duration 70 ms    QT Interval 376 ms    QTC Calculation(Bazett) 459 ms    P Axis 76 degrees    R Axis 90 degrees    T Axis 81 degrees    QRS Count 14 beats    Q Onset 223 ms    P  Onset 143 ms    P Offset 191 ms    T Offset 411 ms    QTC Fredericia 430 ms   Troponin, High Sensitivity, 1 Hour   Result Value Ref Range    Troponin I, High Sensitivity 3 0 - 13 ng/L   SST TOP   Result Value Ref Range    Extra Tube Hold for add-ons.    Urinalysis with Reflex Culture and Microscopic   Result Value Ref Range    Color, Urine Yellow Light-Yellow, Yellow, Dark-Yellow    Appearance, Urine Turbid (N) Clear    Specific Gravity, Urine 1.017 1.005 - 1.035    pH, Urine 5.5 5.0, 5.5, 6.0, 6.5, 7.0, 7.5, 8.0    Protein, Urine 50 (1+) (A) NEGATIVE, 10 (TRACE), 20 (TRACE) mg/dL    Glucose, Urine OVER (4+) (A) Normal mg/dL    Blood, Urine NEGATIVE NEGATIVE mg/dL    Ketones, Urine 40 (2+) (A) NEGATIVE mg/dL    Bilirubin, Urine NEGATIVE NEGATIVE mg/dL    Urobilinogen, Urine Normal Normal mg/dL    Nitrite, Urine NEGATIVE NEGATIVE    Leukocyte Esterase, Urine 75 Sarah/uL (A) NEGATIVE   Microscopic Only, Urine   Result Value Ref Range    WBC, Urine 11-20 (A) 1-5, NONE /HPF    WBC Clumps, Urine RARE Reference range not established. /HPF    RBC, Urine NONE NONE, 1-2, 3-5 /HPF    Squamous Epithelial Cells, Urine 1-9 (SPARSE) Reference range not established. /HPF    Bacteria, Urine 1+ (A) NONE SEEN /HPF    Mucus, Urine 1+ Reference range not established. /LPF    Hyaline Casts, Urine 3+ (A) NONE /LPF    Calcium Oxalate Crystals, Urine 2+ (A) NONE, 1+ /HPF   POCT GLUCOSE   Result Value Ref Range    POCT Glucose 341 (H) 74 - 99 mg/dL       Assessment & Plan    # Generalized Weakness  # Diarrhea  # Nausea / Vomiting   # Hypomagnesia  # Hypokalemia  # Hypotension  Admit to hospital medicine  LR at 100ml/hr   Replace Electrolytes  Calprotectin pending   Stool PCR / Cdiff pending   Antinemetics   Consult GI team for further recommendations   Discharged from [facility] - suspected microscopic colitis  Colonoscopy results from [facility] pending per family - no intervention at that time   Results from [facility] EMR:   - Hepatitis AB  and C neg  - Cortisol 21.77  - Lipase 31  - Sed rate 34  - calprotectin fecal 124  - Giardia negative    # Diabetes Mellitus Type 2  Continue home medications  Diet Cardiac/Diabetic  Continue Sliding Scale SSI AC/HS   A1C 9.3  Encourage healthy lifestyle changes  Hold oral medications  Resume home meds as indicated     # HTN/ HLD  Hold hypertensive medications at this time     # Hypothyroidism  Continue home medications   Resume home meds when reconciled    # Abnormal UA  Showing + leuk 75  +1 bacteria  NO signs or symptoms of dysuria, frequency  Hold off on abx at this time        At time of dictation home meds are not reconciled     Full code  Diet: Clear  Disposition: home 24 hours  Dvtp: Lovenox    Time spent  60  minutes obtaining labs, imaging, recommendations, interview, assessment, examination, medication review/ordering, and EMR review.    Plan of care was discussed extensively with patient and family. Patient verbalized understanding through teach back method. All questions and concerns addressed upon examination.     Of note, this documentation is completed using the Dragon Dictation system (voice recognition software). There may be spelling and/or grammatical errors that were not corrected prior to final submission.                   [1]   Past Medical History:  Diagnosis Date    Diabetes mellitus (Multi)     Disease of thyroid gland     Hypertension    [2]   Past Surgical History:  Procedure Laterality Date     SECTION, LOW TRANSVERSE      JOINT REPLACEMENT  left knee   [3]   Family History  Problem Relation Name Age of Onset    Cancer Sister Leeann Arambula     Diabetes Mother Gabriel Gerber     Hypertension Mother Gabriel Gerber     Heart disease Father Austyn Gerber    [4] [5] [6]

## 2025-08-02 VITALS
TEMPERATURE: 97.5 F | RESPIRATION RATE: 16 BRPM | SYSTOLIC BLOOD PRESSURE: 126 MMHG | BODY MASS INDEX: 29.99 KG/M2 | OXYGEN SATURATION: 97 % | HEART RATE: 66 BPM | WEIGHT: 180 LBS | HEIGHT: 65 IN | DIASTOLIC BLOOD PRESSURE: 58 MMHG

## 2025-08-02 LAB
ANION GAP SERPL CALC-SCNC: 11 MMOL/L (ref 10–20)
BUN SERPL-MCNC: 11 MG/DL (ref 6–23)
C COLI+JEJ+UPSA DNA STL QL NAA+PROBE: DETECTED
CALCIUM SERPL-MCNC: 8.1 MG/DL (ref 8.6–10.3)
CHLORIDE SERPL-SCNC: 104 MMOL/L (ref 98–107)
CO2 SERPL-SCNC: 23 MMOL/L (ref 21–32)
CREAT SERPL-MCNC: 0.72 MG/DL (ref 0.5–1.05)
EC STX1 GENE STL QL NAA+PROBE: NOT DETECTED
EC STX2 GENE STL QL NAA+PROBE: NOT DETECTED
EGFRCR SERPLBLD CKD-EPI 2021: >90 ML/MIN/1.73M*2
ERYTHROCYTE [DISTWIDTH] IN BLOOD BY AUTOMATED COUNT: 13.2 % (ref 11.5–14.5)
FERRITIN SERPL-MCNC: 241 NG/ML (ref 8–150)
GLUCOSE BLD MANUAL STRIP-MCNC: 131 MG/DL (ref 74–99)
GLUCOSE BLD MANUAL STRIP-MCNC: 223 MG/DL (ref 74–99)
GLUCOSE BLD MANUAL STRIP-MCNC: 327 MG/DL (ref 74–99)
GLUCOSE SERPL-MCNC: 358 MG/DL (ref 74–99)
HCT VFR BLD AUTO: 33.4 % (ref 36–46)
HGB BLD-MCNC: 10.7 G/DL (ref 12–16)
HOLD SPECIMEN: NORMAL
IRON SATN MFR SERPL: 19 % (ref 25–45)
IRON SERPL-MCNC: 28 UG/DL (ref 35–150)
MAGNESIUM SERPL-MCNC: 1.79 MG/DL (ref 1.6–2.4)
MCH RBC QN AUTO: 29.1 PG (ref 26–34)
MCHC RBC AUTO-ENTMCNC: 32 G/DL (ref 32–36)
MCV RBC AUTO: 91 FL (ref 80–100)
NOROVIRUS GI + GII RNA STL NAA+PROBE: NOT DETECTED
NRBC BLD-RTO: 0 /100 WBCS (ref 0–0)
PLATELET # BLD AUTO: 350 X10*3/UL (ref 150–450)
POTASSIUM SERPL-SCNC: 4.2 MMOL/L (ref 3.5–5.3)
RBC # BLD AUTO: 3.68 X10*6/UL (ref 4–5.2)
RV RNA STL NAA+PROBE: NOT DETECTED
SALMONELLA DNA STL QL NAA+PROBE: NOT DETECTED
SHIGELLA DNA SPEC QL NAA+PROBE: NOT DETECTED
SODIUM SERPL-SCNC: 134 MMOL/L (ref 136–145)
TIBC SERPL-MCNC: 146 UG/DL (ref 240–445)
UIBC SERPL-MCNC: 118 UG/DL (ref 110–370)
V CHOLERAE DNA STL QL NAA+PROBE: NOT DETECTED
WBC # BLD AUTO: 6.2 X10*3/UL (ref 4.4–11.3)
Y ENTEROCOL DNA STL QL NAA+PROBE: NOT DETECTED

## 2025-08-02 PROCEDURE — 82728 ASSAY OF FERRITIN: CPT

## 2025-08-02 PROCEDURE — 2500000001 HC RX 250 WO HCPCS SELF ADMINISTERED DRUGS (ALT 637 FOR MEDICARE OP): Performed by: INTERNAL MEDICINE

## 2025-08-02 PROCEDURE — 99238 HOSP IP/OBS DSCHRG MGMT 30/<: CPT

## 2025-08-02 PROCEDURE — 36415 COLL VENOUS BLD VENIPUNCTURE: CPT | Performed by: REGISTERED NURSE

## 2025-08-02 PROCEDURE — 2500000002 HC RX 250 W HCPCS SELF ADMINISTERED DRUGS (ALT 637 FOR MEDICARE OP, ALT 636 FOR OP/ED): Performed by: REGISTERED NURSE

## 2025-08-02 PROCEDURE — 96361 HYDRATE IV INFUSION ADD-ON: CPT | Mod: 59

## 2025-08-02 PROCEDURE — 80048 BASIC METABOLIC PNL TOTAL CA: CPT | Performed by: REGISTERED NURSE

## 2025-08-02 PROCEDURE — 2500000004 HC RX 250 GENERAL PHARMACY W/ HCPCS (ALT 636 FOR OP/ED): Performed by: REGISTERED NURSE

## 2025-08-02 PROCEDURE — 2500000002 HC RX 250 W HCPCS SELF ADMINISTERED DRUGS (ALT 637 FOR MEDICARE OP, ALT 636 FOR OP/ED): Performed by: STUDENT IN AN ORGANIZED HEALTH CARE EDUCATION/TRAINING PROGRAM

## 2025-08-02 PROCEDURE — 82947 ASSAY GLUCOSE BLOOD QUANT: CPT | Mod: 59

## 2025-08-02 PROCEDURE — G0378 HOSPITAL OBSERVATION PER HR: HCPCS

## 2025-08-02 PROCEDURE — 2500000002 HC RX 250 W HCPCS SELF ADMINISTERED DRUGS (ALT 637 FOR MEDICARE OP, ALT 636 FOR OP/ED)

## 2025-08-02 PROCEDURE — 83735 ASSAY OF MAGNESIUM: CPT

## 2025-08-02 PROCEDURE — 83550 IRON BINDING TEST: CPT

## 2025-08-02 PROCEDURE — 85027 COMPLETE CBC AUTOMATED: CPT | Performed by: REGISTERED NURSE

## 2025-08-02 PROCEDURE — 99222 1ST HOSP IP/OBS MODERATE 55: CPT | Performed by: INTERNAL MEDICINE

## 2025-08-02 RX ORDER — LEVOTHYROXINE SODIUM 112 UG/1
112 TABLET ORAL DAILY
Status: DISCONTINUED | OUTPATIENT
Start: 2025-08-02 | End: 2025-08-02 | Stop reason: HOSPADM

## 2025-08-02 RX ORDER — AZITHROMYCIN 250 MG/1
500 TABLET, FILM COATED ORAL DAILY
Status: DISCONTINUED | OUTPATIENT
Start: 2025-08-02 | End: 2025-08-02 | Stop reason: HOSPADM

## 2025-08-02 RX ORDER — DEXTROSE 50 % IN WATER (D50W) INTRAVENOUS SYRINGE
12.5
Status: DISCONTINUED | OUTPATIENT
Start: 2025-08-02 | End: 2025-08-02 | Stop reason: HOSPADM

## 2025-08-02 RX ORDER — AZITHROMYCIN 250 MG/1
TABLET, FILM COATED ORAL
Qty: 6 TABLET | Refills: 0 | Status: SHIPPED | OUTPATIENT
Start: 2025-08-03

## 2025-08-02 RX ORDER — LOPERAMIDE HYDROCHLORIDE 2 MG/1
2 CAPSULE ORAL
Status: DISCONTINUED | OUTPATIENT
Start: 2025-08-02 | End: 2025-08-02

## 2025-08-02 RX ORDER — DEXTROSE 50 % IN WATER (D50W) INTRAVENOUS SYRINGE
25
Status: DISCONTINUED | OUTPATIENT
Start: 2025-08-02 | End: 2025-08-02 | Stop reason: HOSPADM

## 2025-08-02 RX ORDER — INSULIN GLARGINE 100 [IU]/ML
30 INJECTION, SOLUTION SUBCUTANEOUS DAILY
Status: DISCONTINUED | OUTPATIENT
Start: 2025-08-02 | End: 2025-08-02 | Stop reason: HOSPADM

## 2025-08-02 RX ADMIN — INSULIN LISPRO 8 UNITS: 100 INJECTION, SOLUTION INTRAVENOUS; SUBCUTANEOUS at 08:59

## 2025-08-02 RX ADMIN — AZITHROMYCIN 500 MG: 250 TABLET, FILM COATED ORAL at 16:14

## 2025-08-02 RX ADMIN — LEVOTHYROXINE SODIUM 112 MCG: 112 TABLET ORAL at 06:51

## 2025-08-02 RX ADMIN — SODIUM CHLORIDE, SODIUM LACTATE, POTASSIUM CHLORIDE, AND CALCIUM CHLORIDE 100 ML/HR: .6; .31; .03; .02 INJECTION, SOLUTION INTRAVENOUS at 06:51

## 2025-08-02 RX ADMIN — INSULIN LISPRO 4 UNITS: 100 INJECTION, SOLUTION INTRAVENOUS; SUBCUTANEOUS at 11:52

## 2025-08-02 RX ADMIN — INSULIN GLARGINE 30 UNITS: 100 INJECTION, SOLUTION SUBCUTANEOUS at 08:58

## 2025-08-02 RX ADMIN — LOPERAMIDE HYDROCHLORIDE 2 MG: 2 CAPSULE ORAL at 15:30

## 2025-08-02 ASSESSMENT — COGNITIVE AND FUNCTIONAL STATUS - GENERAL
MOBILITY SCORE: 24
DAILY ACTIVITIY SCORE: 24

## 2025-08-02 ASSESSMENT — ENCOUNTER SYMPTOMS
ABDOMINAL PAIN: 0
ABDOMINAL DISTENTION: 0
CONSTIPATION: 0
VOMITING: 1
CHEST TIGHTNESS: 0
NAUSEA: 1
DIARRHEA: 1
UNEXPECTED WEIGHT CHANGE: 0
BLOOD IN STOOL: 0

## 2025-08-02 ASSESSMENT — PAIN SCALES - GENERAL: PAINLEVEL_OUTOF10: 0 - NO PAIN

## 2025-08-02 NOTE — CARE PLAN
The patient's goals for the shift include rest    The clinical goals for the shift include maintain patient safety and comfort

## 2025-08-02 NOTE — CONSULTS
Inpatient consult to gastroenterology  Consult performed by: Flores SCHNEIDER MD  Consult ordered by: ABHILASH Wellington-CNP          Reason For Consult  diarrhea    History Of Present Illness  Alina Chavarria is a 64 y.o. female presenting with recurrent diarrhea.  The patient reports that for the past 1 month she has been having severe diarrhea generally 30 minutes after meals but also nocturnal symptoms up to 7-10 times per day.  She denies any blood or mucus in the stools.  She denies any significant abdominal pain or cramping.  She was admitted to [facility] Over the past 7 days and underwent an extensive workup including stool studies and a colonoscopy.  Random biopsies are still pending.  She was advised to start taking Imodium on discharge.  She went home for less than 24 hours and started having vomiting as well as diarrhea lightheadedness and presyncopal so she was sent to the emergency room by EMS.  Since admission she has only had 2 bowel movements but she is only been on clear liquids.  She denies any new medications, NSAIDs, antibiotics.  She was on Mounjaro but that has been discontinued over 3 weeks and the diarrhea persisted.  She has not had any further emesis since admission.     Past Medical History  She has a past medical history of Diabetes mellitus (Multi), Disease of thyroid gland, and Hypertension.    Surgical History  She has a past surgical history that includes  section, low transverse and Joint replacement (left knee).     Social History  She reports that she has quit smoking. Her smoking use included cigarettes. She has never used smokeless tobacco. She reports current alcohol use. She reports that she does not currently use drugs.    Family History  Family History[1]     Allergies  Patient has no known allergies.    Review of Systems   Constitutional:  Negative for unexpected weight change.   Respiratory:  Negative for chest tightness.    Cardiovascular:  Negative for  "chest pain.   Gastrointestinal:  Positive for diarrhea, nausea and vomiting. Negative for abdominal distention, abdominal pain, blood in stool and constipation.        Physical Exam  Constitutional:       Comments: Awake   HENT:      Head: Normocephalic.     Cardiovascular:      Rate and Rhythm: Normal rate and regular rhythm.   Pulmonary:      Effort: Pulmonary effort is normal.      Breath sounds: Normal breath sounds.   Abdominal:      General: Bowel sounds are normal. There is no distension.      Palpations: Abdomen is soft.      Tenderness: There is no abdominal tenderness. There is no guarding or rebound.     Neurological:      Mental Status: She is alert.     Psychiatric:         Mood and Affect: Mood normal.          Last Recorded Vitals  Blood pressure 118/57, pulse 82, temperature 36.6 °C (97.9 °F), resp. rate 16, height 1.651 m (5' 5\"), weight 81.6 kg (180 lb), SpO2 95%.        Relevant Results      Results for orders placed or performed during the hospital encounter of 08/01/25 (from the past 96 hours)   CBC and Auto Differential   Result Value Ref Range    WBC 14.9 (H) 4.4 - 11.3 x10*3/uL    nRBC 0.0 0.0 - 0.0 /100 WBCs    RBC 4.88 4.00 - 5.20 x10*6/uL    Hemoglobin 14.2 12.0 - 16.0 g/dL    Hematocrit 44.0 36.0 - 46.0 %    MCV 90 80 - 100 fL    MCH 29.1 26.0 - 34.0 pg    MCHC 32.3 32.0 - 36.0 g/dL    RDW 13.2 11.5 - 14.5 %    Platelets 444 150 - 450 x10*3/uL    Neutrophils % 91.5 40.0 - 80.0 %    Immature Granulocytes %, Automated 0.3 0.0 - 0.9 %    Lymphocytes % 3.4 13.0 - 44.0 %    Monocytes % 4.1 2.0 - 10.0 %    Eosinophils % 0.5 0.0 - 6.0 %    Basophils % 0.2 0.0 - 2.0 %    Neutrophils Absolute 13.59 (H) 1.20 - 7.70 x10*3/uL    Immature Granulocytes Absolute, Automated 0.05 0.00 - 0.70 x10*3/uL    Lymphocytes Absolute 0.51 (L) 1.20 - 4.80 x10*3/uL    Monocytes Absolute 0.61 0.10 - 1.00 x10*3/uL    Eosinophils Absolute 0.08 0.00 - 0.70 x10*3/uL    Basophils Absolute 0.03 0.00 - 0.10 x10*3/uL "   Comprehensive Metabolic Panel   Result Value Ref Range    Glucose 328 (H) 74 - 99 mg/dL    Sodium 138 136 - 145 mmol/L    Potassium 3.4 (L) 3.5 - 5.3 mmol/L    Chloride 102 98 - 107 mmol/L    Bicarbonate 20 (L) 21 - 32 mmol/L    Anion Gap 19 10 - 20 mmol/L    Urea Nitrogen 12 6 - 23 mg/dL    Creatinine 1.01 0.50 - 1.05 mg/dL    eGFR 62 >60 mL/min/1.73m*2    Calcium 8.9 8.6 - 10.3 mg/dL    Albumin 3.9 3.4 - 5.0 g/dL    Alkaline Phosphatase 114 33 - 136 U/L    Total Protein 6.7 6.4 - 8.2 g/dL    AST 22 9 - 39 U/L    Bilirubin, Total 0.9 0.0 - 1.2 mg/dL    ALT 54 (H) 7 - 45 U/L   BLOOD GAS VENOUS FULL PANEL   Result Value Ref Range    POCT pH, Venous 7.31 (L) 7.33 - 7.43 pH    POCT pCO2, Venous 22 (L) 41 - 51 mm Hg    POCT pO2, Venous 24 (L) 35 - 45 mm Hg    POCT SO2, Venous 38 (L) 45 - 75 %    POCT Oxy Hemoglobin, Venous 37.2 (L) 45.0 - 75.0 %    POCT Hematocrit Calculated, Venous 24.0 (L) 36.0 - 46.0 %    POCT Sodium, Venous 142 136 - 145 mmol/L    POCT Potassium, Venous 1.5 (LL) 3.5 - 5.3 mmol/L    POCT Chloride, Venous 122 (H) 98 - 107 mmol/L    POCT Ionized Calicum, Venous 0.69 (LL) 1.10 - 1.33 mmol/L    POCT Glucose, Venous 198 (H) 74 - 99 mg/dL    POCT Lactate, Venous 1.6 0.4 - 2.0 mmol/L    POCT Base Excess, Venous -13.7 (L) -2.0 - 3.0 mmol/L    POCT HCO3 Calculated, Venous 11.1 (L) 22.0 - 26.0 mmol/L    POCT Hemoglobin, Venous 8.0 (L) 12.0 - 16.0 g/dL    POCT Anion Gap, Venous 10.0 10.0 - 25.0 mmol/L    Patient Temperature      FiO2 21 %    Critical Called By SONYA WISEMAN     Critical Called To DR. ALVARENGA     Critical Call Time 1442     Critical Read Back Y    Troponin I, High Sensitivity, Initial   Result Value Ref Range    Troponin I, High Sensitivity 4 0 - 13 ng/L   TSH with reflex to Free T4 if abnormal   Result Value Ref Range    Thyroid Stimulating Hormone 3.80 0.44 - 3.98 mIU/L   Light Blue Top   Result Value Ref Range    Extra Tube Hold for add-ons.    Lavender Top   Result Value Ref Range    Extra Tube  Hold for add-ons.    Gray Top   Result Value Ref Range    Extra Tube Hold for add-ons.    Magnesium   Result Value Ref Range    Magnesium 1.50 (L) 1.60 - 2.40 mg/dL   Phosphorus   Result Value Ref Range    Phosphorus 3.0 2.5 - 4.9 mg/dL   ECG 12 lead   Result Value Ref Range    Ventricular Rate 88 BPM    Atrial Rate 88 BPM    KY Interval 156 ms    QRS Duration 78 ms    QT Interval 372 ms    QTC Calculation(Bazett) 450 ms    P Axis 75 degrees    R Axis 86 degrees    T Axis 66 degrees    QRS Count 15 beats    Q Onset 221 ms    P Onset 143 ms    P Offset 197 ms    T Offset 407 ms    QTC Fredericia 422 ms   BLOOD GAS VENOUS FULL PANEL   Result Value Ref Range    POCT pH, Venous 7.35 7.33 - 7.43 pH    POCT pCO2, Venous 40 (L) 41 - 51 mm Hg    POCT pO2, Venous 24 (L) 35 - 45 mm Hg    POCT SO2, Venous 38 (L) 45 - 75 %    POCT Oxy Hemoglobin, Venous 37.2 (L) 45.0 - 75.0 %    POCT Hematocrit Calculated, Venous 38.0 36.0 - 46.0 %    POCT Sodium, Venous 133 (L) 136 - 145 mmol/L    POCT Potassium, Venous 4.0 3.5 - 5.3 mmol/L    POCT Chloride, Venous 101 98 - 107 mmol/L    POCT Ionized Calicum, Venous 1.17 1.10 - 1.33 mmol/L    POCT Glucose, Venous 388 (H) 74 - 99 mg/dL    POCT Lactate, Venous 2.2 (H) 0.4 - 2.0 mmol/L    POCT Base Excess, Venous -3.3 (L) -2.0 - 3.0 mmol/L    POCT HCO3 Calculated, Venous 22.1 22.0 - 26.0 mmol/L    POCT Hemoglobin, Venous 12.6 12.0 - 16.0 g/dL    POCT Anion Gap, Venous 14.0 10.0 - 25.0 mmol/L    Patient Temperature      FiO2 21 %   ECG 12 lead   Result Value Ref Range    Ventricular Rate 90 BPM    Atrial Rate 90 BPM    KY Interval 160 ms    QRS Duration 70 ms    QT Interval 376 ms    QTC Calculation(Bazett) 459 ms    P Axis 76 degrees    R Axis 90 degrees    T Axis 81 degrees    QRS Count 14 beats    Q Onset 223 ms    P Onset 143 ms    P Offset 191 ms    T Offset 411 ms    QTC Fredericia 430 ms   Troponin, High Sensitivity, 1 Hour   Result Value Ref Range    Troponin I, High Sensitivity 3 0 - 13  ng/L   SST TOP   Result Value Ref Range    Extra Tube Hold for add-ons.    Urinalysis with Reflex Culture and Microscopic   Result Value Ref Range    Color, Urine Yellow Light-Yellow, Yellow, Dark-Yellow    Appearance, Urine Turbid (N) Clear    Specific Gravity, Urine 1.017 1.005 - 1.035    pH, Urine 5.5 5.0, 5.5, 6.0, 6.5, 7.0, 7.5, 8.0    Protein, Urine 50 (1+) (A) NEGATIVE, 10 (TRACE), 20 (TRACE) mg/dL    Glucose, Urine OVER (4+) (A) Normal mg/dL    Blood, Urine NEGATIVE NEGATIVE mg/dL    Ketones, Urine 40 (2+) (A) NEGATIVE mg/dL    Bilirubin, Urine NEGATIVE NEGATIVE mg/dL    Urobilinogen, Urine Normal Normal mg/dL    Nitrite, Urine NEGATIVE NEGATIVE    Leukocyte Esterase, Urine 75 Sarah/uL (A) NEGATIVE   Microscopic Only, Urine   Result Value Ref Range    WBC, Urine 11-20 (A) 1-5, NONE /HPF    WBC Clumps, Urine RARE Reference range not established. /HPF    RBC, Urine NONE NONE, 1-2, 3-5 /HPF    Squamous Epithelial Cells, Urine 1-9 (SPARSE) Reference range not established. /HPF    Bacteria, Urine 1+ (A) NONE SEEN /HPF    Mucus, Urine 1+ Reference range not established. /LPF    Hyaline Casts, Urine 3+ (A) NONE /LPF    Calcium Oxalate Crystals, Urine 2+ (A) NONE, 1+ /HPF   POCT GLUCOSE   Result Value Ref Range    POCT Glucose 341 (H) 74 - 99 mg/dL   POCT GLUCOSE   Result Value Ref Range    POCT Glucose 352 (H) 74 - 99 mg/dL   C. difficile, PCR    Specimen: Stool   Result Value Ref Range    C. difficile, PCR Not Detected Not Detected   POCT GLUCOSE   Result Value Ref Range    POCT Glucose 327 (H) 74 - 99 mg/dL   CBC   Result Value Ref Range    WBC 6.2 4.4 - 11.3 x10*3/uL    nRBC 0.0 0.0 - 0.0 /100 WBCs    RBC 3.68 (L) 4.00 - 5.20 x10*6/uL    Hemoglobin 10.7 (L) 12.0 - 16.0 g/dL    Hematocrit 33.4 (L) 36.0 - 46.0 %    MCV 91 80 - 100 fL    MCH 29.1 26.0 - 34.0 pg    MCHC 32.0 32.0 - 36.0 g/dL    RDW 13.2 11.5 - 14.5 %    Platelets 350 150 - 450 x10*3/uL   Basic metabolic panel   Result Value Ref Range    Glucose 358  "(H) 74 - 99 mg/dL    Sodium 134 (L) 136 - 145 mmol/L    Potassium 4.2 3.5 - 5.3 mmol/L    Chloride 104 98 - 107 mmol/L    Bicarbonate 23 21 - 32 mmol/L    Anion Gap 11 10 - 20 mmol/L    Urea Nitrogen 11 6 - 23 mg/dL    Creatinine 0.72 0.50 - 1.05 mg/dL    eGFR >90 >60 mL/min/1.73m*2    Calcium 8.1 (L) 8.6 - 10.3 mg/dL   Magnesium   Result Value Ref Range    Magnesium 1.79 1.60 - 2.40 mg/dL   Iron and TIBC   Result Value Ref Range    Iron 28 (L) 35 - 150 ug/dL    UIBC 118 110 - 370 ug/dL    TIBC 146 (L) 240 - 445 ug/dL    % Saturation 19 (L) 25 - 45 %   Ferritin   Result Value Ref Range    Ferritin 241 (H) 8 - 150 ng/mL   SST TOP   Result Value Ref Range    Extra Tube Hold for add-ons.    POCT GLUCOSE   Result Value Ref Range    POCT Glucose 223 (H) 74 - 99 mg/dL        No results found for: \"CBCDIF\"     No results found for this or any previous visit from the past 365 days.            Assessment/Plan     64-year-old female with underlying history of hypertension diabetes hypothyroidism with 1 month of persistent diarrhea.  She was recently admitted to [facility] Underwent workup including stool studies that were apparently negative and a colonoscopy where the biopsies are pending.  Since admission she has not had any further nausea vomiting and had 2 episodes of stools.  She is hungry and wants to advance her diet.  At this point I would recommend starting Imodium 30 minutes before meals and at bedtime if needed as well as advancing her diet as tolerated.  She can follow-up with her gastroenterologist that she is established with or if she would like to see us in the office at [facility] we are happy to schedule that.  If her diarrhea resolves and she is able to tolerate adequate diet she is okay to be discharged home.  Replace electrolytes as needed    I spent 60 minutes in the professional and overall care of this patient.              [1]   Family History  Problem Relation Name Age of Onset    Cancer " Sister Leeann Arambula     Diabetes Mother Gabriel Gerber     Hypertension Mother Gabriel Gerber     Heart disease Father Austyn eGrber       Normal vision: sees adequately in most situations; can see medication labels, newsprint

## 2025-08-02 NOTE — DISCHARGE INSTRUCTIONS
Thank you for choosing [facility]. It has been a pleasure taking part in your medical care. Please follow up with your primary care provider as instructed. If your symptoms should persist or worsen, please contact your primary care physician, or in the case of an emergency proceed to the nearest Emergency Room for further care. If you have any questions about the care you received, please call [facility] at [phone]. Thank you again! JOEY Webb

## 2025-08-02 NOTE — PROGRESS NOTES
"Daily Progress Note    Alina Chavarria is a 64 y.o. female on day 0 of admission presenting with Generalized weakness.    Subjective   Patient resting company with family at bedside.  IV fluids infusing.  Patient denies any nausea vomiting or diarrhea at this time.  Pending GI recommendations.  Continue clear liquid diet for now.       Objective     Physical Exam    Physical Exam  Constitutional:       Appearance: Normal appearance.   HENT:      Head: Normocephalic.      Mouth/Throat:      Mouth: Mucous membranes are moist.     Eyes:      Pupils: Pupils are equal, round, and reactive to light.       Cardiovascular:      Rate and Rhythm: Normal rate and regular rhythm.      Heart sounds: Normal heart sounds, S1 normal and S2 normal.   Pulmonary:      Effort: Pulmonary effort is normal.      Breath sounds: Normal breath sounds.   Abdominal:      General: Bowel sounds are normal.      Palpations: Abdomen is soft.     Musculoskeletal:         General: Normal range of motion.      Cervical back: Neck supple.     Skin:     General: Skin is warm.     Neurological:      Mental Status: She is alert and oriented to person, place, and time.     Psychiatric:         Mood and Affect: Mood normal.         Behavior: Behavior normal.         Last Recorded Vitals  Blood pressure 118/57, pulse 82, temperature 36.6 °C (97.9 °F), resp. rate 16, height 1.651 m (5' 5\"), weight 81.6 kg (180 lb), SpO2 95%.  Intake/Output last 3 Shifts:  I/O last 3 completed shifts:  In: 866.3 (10.6 mL/kg) [I.V.:866.3 (10.6 mL/kg)]  Out: - (0 mL/kg)   Weight: 81.6 kg     Medications  Scheduled medications  Scheduled Medications[1]  Continuous medications  Continuous Medications[2]  PRN medications  PRN Medications[3]    Labs  CBC:   Results from last 7 days   Lab Units 08/02/25  0715 08/01/25  1416   WBC AUTO x10*3/uL 6.2 14.9*   RBC AUTO x10*6/uL 3.68* 4.88   HEMOGLOBIN g/dL 10.7* 14.2   HEMATOCRIT % 33.4* 44.0   MCV fL 91 90   MCH pg 29.1 29.1   MCHC g/dL " 32.0 32.3   RDW % 13.2 13.2   PLATELETS AUTO x10*3/uL 350 444     CMP:    Results from last 7 days   Lab Units 08/02/25  0715 08/01/25  1416   SODIUM mmol/L 134* 138   POTASSIUM mmol/L 4.2 3.4*   CHLORIDE mmol/L 104 102   CO2 mmol/L 23 20*   BUN mg/dL 11 12   CREATININE mg/dL 0.72 1.01   GLUCOSE mg/dL 358* 328*   PROTEIN TOTAL g/dL  --  6.7   CALCIUM mg/dL 8.1* 8.9   BILIRUBIN TOTAL mg/dL  --  0.9   ALK PHOS U/L  --  114   AST U/L  --  22   ALT U/L  --  54*     BMP:    Results from last 7 days   Lab Units 08/02/25  0715 08/01/25  1416   SODIUM mmol/L 134* 138   POTASSIUM mmol/L 4.2 3.4*   CHLORIDE mmol/L 104 102   CO2 mmol/L 23 20*   BUN mg/dL 11 12   CREATININE mg/dL 0.72 1.01   CALCIUM mg/dL 8.1* 8.9   GLUCOSE mg/dL 358* 328*     Magnesium:  Results from last 7 days   Lab Units 08/02/25  0715 08/01/25  1416   MAGNESIUM mg/dL 1.79 1.50*     Troponin:    Results from last 7 days   Lab Units 08/01/25  1548 08/01/25  1416   TROPHS ng/L 3 4     Relevant Results  Results from last 7 days   Lab Units 08/02/25  1149 08/02/25  0715 08/02/25  0649 08/01/25  1758 08/01/25  1609 08/01/25  1416   POCT GLUCOSE mg/dL 223*  --  327* 352* 341*  --    GLUCOSE mg/dL  --  358*  --   --   --  328*     Lab Results   Component Value Date    HGBA1C 9.3 (H) 05/02/2025        Assessment/Plan    Generalized weakness  Hypomagnesia  Dehydration    -IV fluids  -Stool PCR/C. difficile negative  -Consult GI  -Discharged from [facility] suspected microscopic colitis, colonoscopy completed at   -Antiemetics as needed  -Ferritin and iron panel  -PT/OT evaluation    T2DM  HTN/HLD  Hypothyroidism  - A1c 9.3%  -Sliding scale and Accu-Chek  -Resume Lantus 30 units daily    DVTp: Lovenox    PLAN: Home with     Tracey K Morena, APRN-CNP    Plan of care was discussed extensively with patient.  Patient verbalized understanding through teach back method.  All question and concerns addressed upon examination.    Of note, this documentation is  completed using the Dragon Dictation system (voice recognition software). There may be spelling and/or grammatical errors that were not corrected prior to final submission.             [1] enoxaparin, 40 mg, subcutaneous, q24h  insulin glargine, 30 Units, subcutaneous, Daily  insulin lispro, 0-10 Units, subcutaneous, With meals & nightly  levothyroxine, 112 mcg, oral, Daily    [2] lactated Ringer's, 100 mL/hr, Last Rate: 100 mL/hr (08/02/25 1032)    [3] PRN medications: acetaminophen **OR** acetaminophen **OR** acetaminophen, dextrose, dextrose, glucagon, glucagon, melatonin, ondansetron **OR** ondansetron

## 2025-08-02 NOTE — CARE PLAN
The patient's goals for the shift include rest    The clinical goals for the shift include less diarrhea    Over the shift, the patient did not make progress toward the following goals. Barriers to progression include none. Recommendations to address these barriers include stool testing .

## 2025-08-02 NOTE — DISCHARGE SUMMARY
"Discharge Diagnosis  Generalized weakness    Issues Requiring Follow-Up  None    Discharge Meds     Your medication list        CONTINUE taking these medications        Instructions Last Dose Given Next Dose Due   FreeStyle Ney 2 Sensor kit  Generic drug: flash glucose sensor kit      Change sensor EVERY 14 days       FreeStyle Ney 3 Plus Sensor device  Generic drug: blood-glucose sensor      Change sensor every 15 days       pen needle, diabetic 32 gauge x 5/32\" needle  Commonly known as: Unifine Pentips Plus      Use to administer insulin four times daily              ASK your doctor about these medications        Instructions Last Dose Given Next Dose Due   benzonatate 200 mg capsule  Commonly known as: Tessalon      Take 1 capsule (200 mg) by mouth every 8 hours if needed for cough. Do not crush or chew.       insulin lispro 100 unit/mL pen  Commonly known as: HumaLOG KwikPen Insulin      Start humalog 4 units for breakfast, 4 units for lunch and 6 units before dinner plus sliding scale (MDD 44 units daily)       loperamide 2 mg capsule  Commonly known as: Imodium           metFORMIN  mg 24 hr tablet  Commonly known as: Glucophage-XR      Take 2 tablets (1,000 mg) by mouth 2 times a day. With food       Mounjaro 10 mg/0.5 mL pen injector  Generic drug: tirzepatide      Inject 10 mg under the skin every 7 days.       olmesartan 40 mg tablet  Commonly known as: BENIcar           Synthroid 112 mcg tablet  Generic drug: levothyroxine      TAKE 1 TABLET BY MOUTH EVERY MORNING       Toujeo SoloStar U-300 Insulin 300 unit/mL (1.5 mL) pen  Generic drug: insulin glargine      INJECT 40 UNITS SUBCUTANEOUSLY EVERY MORNING       traMADol 50 mg tablet  Commonly known as: Ultram                    Test Results Pending At Discharge  Pending Labs       Order Current Status    Calprotectin Stool In process    Extra Urine Gray Tube In process    Urinalysis with Reflex Culture and Microscopic In process    Urine Culture " In process    Extra Tubes Preliminary result    Extra Tubes Preliminary result    Extra Tubes Preliminary result    Borges Top Preliminary result    Lavender Top Preliminary result    Light Blue Top Preliminary result    SST TOP Preliminary result    SST TOP Preliminary result            Last Vitals  Vitals:    08/02/25 0013 08/02/25 0454 08/02/25 0743 08/02/25 1542   BP: 120/56 104/59 118/57 126/58   BP Location: Left arm Left arm     Patient Position: Lying Lying     Pulse: 85 71 82 66   Resp: 18 16     Temp: 35.4 °C (95.7 °F) 35.2 °C (95.4 °F) 36.6 °C (97.9 °F) 36.4 °C (97.5 °F)   TempSrc: Temporal Temporal     SpO2: 93% 96% 95% 97%   Weight:       Height:           Hospital Course   Alina Chavarria is a 64 y.o. female PMHx of HTN,  DUNCAN, DM 2 on insulin, hypothyroidism presents after being discharged from [facility] for 7 days with near syncope, hypotension and complaint of diarrhea and vomiting. She stated she just got home last evening from [facility], and was eating toast, took her metformin and advil and started vomiting bile for an hour. She was near syncope per family and they took her BP and her SBP was 70s. She denies abdominal pain, dark tarry stools, or coffee ground emesis. They called 911 and came to the ER. She had a colonoscopy at [facility] and per family biopsies are pending.  Patient stool was negative for C. difficile.  Patient's stool came back positive for Campylobacter.  Patient started on azithromycin 500 mg daily x 3 days.  Patient was seen by GI no further workup okay for discharge.  Patient diet advanced and tolerated.  Patient will be discharged home and follow-up with GI outpatient.  On day of discharge patient hemodynamically stable.    Pertinent Physical Exam At Time of Discharge  Physical Exam  Constitutional:       Appearance: Normal appearance.   HENT:      Head: Normocephalic.      Mouth/Throat:      Mouth: Mucous membranes are moist.     Eyes:      Pupils: Pupils are equal, round, and  reactive to light.       Cardiovascular:      Rate and Rhythm: Normal rate and regular rhythm.      Heart sounds: Normal heart sounds, S1 normal and S2 normal.   Pulmonary:      Effort: Pulmonary effort is normal.      Breath sounds: Normal breath sounds.   Abdominal:      General: Bowel sounds are normal.      Palpations: Abdomen is soft.     Musculoskeletal:         General: Normal range of motion.      Cervical back: Neck supple.     Skin:     General: Skin is warm.     Neurological:      Mental Status: She is alert and oriented to person, place, and time.     Psychiatric:         Mood and Affect: Mood normal.         Behavior: Behavior normal.         Outpatient Follow-Up  Future Appointments   Date Time Provider Department Center   8/11/2025  9:20 AM Portillo Pearce DO [id number] [place]   10/22/2025  8:30 AM Lizeth Ward MD [id number] [place]   2/25/2026  9:00 AM Lizeth Ward MD [id number] [place]   6/24/2026  9:00 AM Lizeth Ward MD [id number] [place]         Tracey Berg APRN-CNP

## 2025-08-03 LAB — BACTERIA UR CULT: NORMAL

## 2025-08-04 LAB
ATRIAL RATE: 88 BPM
ATRIAL RATE: 90 BPM
CALPROTECTIN STL-MCNT: 1200 UG/G
P AXIS: 75 DEGREES
P AXIS: 76 DEGREES
P OFFSET: 191 MS
P OFFSET: 197 MS
P ONSET: 143 MS
P ONSET: 143 MS
PR INTERVAL: 156 MS
PR INTERVAL: 160 MS
Q ONSET: 221 MS
Q ONSET: 223 MS
QRS COUNT: 14 BEATS
QRS COUNT: 15 BEATS
QRS DURATION: 70 MS
QRS DURATION: 78 MS
QT INTERVAL: 372 MS
QT INTERVAL: 376 MS
QTC CALCULATION(BAZETT): 450 MS
QTC CALCULATION(BAZETT): 459 MS
QTC FREDERICIA: 422 MS
QTC FREDERICIA: 430 MS
R AXIS: 86 DEGREES
R AXIS: 90 DEGREES
T AXIS: 66 DEGREES
T AXIS: 81 DEGREES
T OFFSET: 407 MS
T OFFSET: 411 MS
VENTRICULAR RATE: 88 BPM
VENTRICULAR RATE: 90 BPM

## 2025-08-05 ENCOUNTER — PATIENT OUTREACH (OUTPATIENT)
Dept: CARE COORDINATION | Facility: CLINIC | Age: 65
End: 2025-08-05
Payer: COMMERCIAL

## 2025-08-05 NOTE — PROGRESS NOTES
First attempt made to reach patient for transitions of care outreach and no contact made with patient. Left voicemail with my name and contact information.     Discharge Diagnosis  Generalized weakness     Issues Requiring Follow-Up-should follow up with PCP. Patient is to call her PCP and request a post hospital follow up.     Thank you,  Nicole Hollins , RN   Nurse Care Manager   [facility] Department   [phone]

## 2025-08-06 ENCOUNTER — PATIENT OUTREACH (OUTPATIENT)
Dept: CARE COORDINATION | Facility: CLINIC | Age: 65
End: 2025-08-06
Payer: COMMERCIAL

## 2025-08-06 NOTE — PROGRESS NOTES
Second attempt made to reach patient .  Called Dr. Meredith Darling office to assist with a post hospital follow up schedule visit.   Spoke to Robina- she is going to send a message to Dr. Phong MA  to see what other times are available.   Mrs. Chavarria is an  and will be back to work this Friday. Prefers first appointment or later in the day.   Also, asking if follow up labs need to be drawn. Advised they normally put those in to be drawn for future and or Meredith Darling MD can make that assessment at post hospital follow up visit.      Discharge Diagnosis  Generalized weakness     Issues Requiring Follow-Up.  Follow up with PCP

## 2025-08-11 ENCOUNTER — APPOINTMENT (OUTPATIENT)
Dept: GASTROENTEROLOGY | Facility: CLINIC | Age: 65
End: 2025-08-11
Payer: COMMERCIAL

## 2025-08-21 DIAGNOSIS — E03.9 HYPOTHYROIDISM, UNSPECIFIED TYPE: ICD-10-CM

## 2025-08-21 RX ORDER — LEVOTHYROXINE SODIUM 112 UG/1
112 TABLET ORAL
Qty: 90 TABLET | Refills: 1 | Status: SHIPPED | OUTPATIENT
Start: 2025-08-21

## 2025-08-27 PROCEDURE — RXMED WILLOW AMBULATORY MEDICATION CHARGE

## 2025-08-28 ENCOUNTER — PHARMACY VISIT (OUTPATIENT)
Dept: PHARMACY | Facility: CLINIC | Age: 65
End: 2025-08-28
Payer: COMMERCIAL

## 2025-10-22 ENCOUNTER — APPOINTMENT (OUTPATIENT)
Dept: ENDOCRINOLOGY | Facility: CLINIC | Age: 65
End: 2025-10-22
Payer: COMMERCIAL

## 2026-02-25 ENCOUNTER — APPOINTMENT (OUTPATIENT)
Dept: ENDOCRINOLOGY | Facility: CLINIC | Age: 66
End: 2026-02-25
Payer: COMMERCIAL

## 2026-06-24 ENCOUNTER — APPOINTMENT (OUTPATIENT)
Dept: ENDOCRINOLOGY | Facility: CLINIC | Age: 66
End: 2026-06-24
Payer: COMMERCIAL